# Patient Record
Sex: FEMALE | Race: OTHER | Employment: FULL TIME | URBAN - METROPOLITAN AREA
[De-identification: names, ages, dates, MRNs, and addresses within clinical notes are randomized per-mention and may not be internally consistent; named-entity substitution may affect disease eponyms.]

---

## 2018-04-17 ENCOUNTER — NURSE TRIAGE (OUTPATIENT)
Dept: OTHER | Age: 43
End: 2018-04-17

## 2018-04-17 NOTE — TELEPHONE ENCOUNTER
Action Requested: Summary for Provider     []  Critical Lab, Recommendations Needed  [] Need Additional Advice  []   FYI    []   Need Orders  [] Need Medications Sent to Pharmacy  []  Other     SUMMARY:   Only wants to see Dr. Naty Kimbrough.    Please advise when

## 2018-04-18 NOTE — TELEPHONE ENCOUNTER
Received call from patient who reports the pain in the abdomen and both breasts is still present. Patient reports she does not have any new symptom. Patient reports she was prescribed Minocycline 100mg BID by the dermatologist for Rosacea.  Patient reports

## 2018-04-20 ENCOUNTER — OFFICE VISIT (OUTPATIENT)
Dept: FAMILY MEDICINE CLINIC | Facility: CLINIC | Age: 43
End: 2018-04-20

## 2018-04-20 ENCOUNTER — APPOINTMENT (OUTPATIENT)
Dept: LAB | Age: 43
End: 2018-04-20
Attending: FAMILY MEDICINE
Payer: COMMERCIAL

## 2018-04-20 VITALS — HEIGHT: 58.5 IN | BODY MASS INDEX: 28.81 KG/M2 | WEIGHT: 141 LBS

## 2018-04-20 DIAGNOSIS — R10.13 EPIGASTRIC PAIN: ICD-10-CM

## 2018-04-20 DIAGNOSIS — Z00.00 PHYSICAL EXAM: Primary | ICD-10-CM

## 2018-04-20 PROCEDURE — 99212 OFFICE O/P EST SF 10 MIN: CPT | Performed by: FAMILY MEDICINE

## 2018-04-20 PROCEDURE — 99213 OFFICE O/P EST LOW 20 MIN: CPT | Performed by: FAMILY MEDICINE

## 2018-04-20 RX ORDER — PANTOPRAZOLE SODIUM 40 MG/1
40 TABLET, DELAYED RELEASE ORAL
Qty: 30 TABLET | Refills: 0 | Status: SHIPPED | OUTPATIENT
Start: 2018-04-20 | End: 2018-06-09

## 2018-04-20 RX ORDER — MINOCYCLINE HYDROCHLORIDE 100 MG/1
CAPSULE ORAL
Refills: 2 | COMMUNITY
Start: 2018-04-05 | End: 2018-05-08 | Stop reason: ALTCHOICE

## 2018-04-20 NOTE — TELEPHONE ENCOUNTER
Pt states she feels the same, no changes in symptoms, would like to schedule appt, appt scheduled today. Above information obtained with [employer] assistance, agent # [id number].

## 2018-04-21 ENCOUNTER — LAB ENCOUNTER (OUTPATIENT)
Dept: LAB | Age: 43
End: 2018-04-21
Attending: FAMILY MEDICINE
Payer: COMMERCIAL

## 2018-04-21 DIAGNOSIS — Z00.00 PHYSICAL EXAM: ICD-10-CM

## 2018-04-21 PROCEDURE — 80050 GENERAL HEALTH PANEL: CPT

## 2018-04-21 PROCEDURE — 80053 COMPREHEN METABOLIC PANEL: CPT

## 2018-04-21 PROCEDURE — 84443 ASSAY THYROID STIM HORMONE: CPT

## 2018-04-21 PROCEDURE — 36415 COLL VENOUS BLD VENIPUNCTURE: CPT

## 2018-04-21 PROCEDURE — 80061 LIPID PANEL: CPT

## 2018-04-21 PROCEDURE — 87338 HPYLORI STOOL AG IA: CPT

## 2018-04-25 ENCOUNTER — TELEPHONE (OUTPATIENT)
Dept: FAMILY MEDICINE CLINIC | Facility: CLINIC | Age: 43
End: 2018-04-25

## 2018-04-25 RX ORDER — LANSOPRAZOLE, AMOXICILLIN, CLARITHROMYCIN 30-500-500
1 KIT ORAL 2 TIMES DAILY
Qty: 1 EACH | Refills: 0 | Status: SHIPPED | OUTPATIENT
Start: 2018-04-25 | End: 2018-07-31

## 2018-04-25 NOTE — TELEPHONE ENCOUNTER
Pt contacted (Name and  verified) and informed of result message below. Pt declined  and verbalizes understanding. 2 week f/o OV appt made for 5/10/18.     Pt instructed to avoid spicy, greasy foods, carbonated beverages and monitor ca

## 2018-04-27 ENCOUNTER — OFFICE VISIT (OUTPATIENT)
Dept: FAMILY MEDICINE CLINIC | Facility: CLINIC | Age: 43
End: 2018-04-27

## 2018-04-27 VITALS
HEIGHT: 58.25 IN | WEIGHT: 141 LBS | HEART RATE: 74 BPM | TEMPERATURE: 99 F | DIASTOLIC BLOOD PRESSURE: 72 MMHG | SYSTOLIC BLOOD PRESSURE: 114 MMHG | BODY MASS INDEX: 29.2 KG/M2

## 2018-04-27 DIAGNOSIS — R10.9 ABDOMINAL PAIN, UNSPECIFIED ABDOMINAL LOCATION: Primary | ICD-10-CM

## 2018-04-27 DIAGNOSIS — Z01.419 WOMEN'S ANNUAL ROUTINE GYNECOLOGICAL EXAMINATION: ICD-10-CM

## 2018-04-27 PROCEDURE — 99396 PREV VISIT EST AGE 40-64: CPT | Performed by: FAMILY MEDICINE

## 2018-04-27 PROCEDURE — 99212 OFFICE O/P EST SF 10 MIN: CPT | Performed by: FAMILY MEDICINE

## 2018-04-27 PROCEDURE — 99213 OFFICE O/P EST LOW 20 MIN: CPT | Performed by: FAMILY MEDICINE

## 2018-04-27 RX ORDER — HYOSCYAMINE SULFATE EXTENDED-RELEASE 0.38 MG/1
0.38 TABLET ORAL EVERY 12 HOURS PRN
Qty: 50 TABLET | Refills: 0 | Status: SHIPPED | OUTPATIENT
Start: 2018-04-27 | End: 2018-07-31

## 2018-04-29 NOTE — PROGRESS NOTES
HPI:    Patient ID: Carlito Garcia is a 38year old female. Has some abdominal pain in epigastrric area especially and on the upper left side. States that there is more stress lately. Some nauusea no vomiting. No diarrhea.  No constipation        Review of tenderness              ASSESSMENT/PLAN:   Physical exam  (primary encounter diagnosis)  Epigastric pain  Appears as gastritis. Start protonix.  Await other results  f/u next week    Orders Placed This Encounter      Comp Metabolic Panel (14) [E]      Lipid

## 2018-04-29 NOTE — PROGRESS NOTES
HPI:    Patient ID: Constantine Camacho is a 38year old female. abd pain continues. protonix did not help but may be little. Started prevpack. Pain comes and goes lasts for fe minutes, most located on the left side.          Review of Systems   Constitutiona normal. No respiratory distress. She has no wheezes. She exhibits no tenderness. Abdominal: Soft. Bowel sounds are normal. She exhibits no distension and no mass. There is no tenderness. There is no rebound and no guarding.  Hernia confirmed negative in t (PMF=[id number])       [id number]

## 2018-05-02 ENCOUNTER — HOSPITAL ENCOUNTER (OUTPATIENT)
Dept: CT IMAGING | Age: 43
Discharge: HOME | End: 2018-05-02
Attending: FAMILY MEDICINE
Payer: COMMERCIAL

## 2018-05-02 DIAGNOSIS — R10.9 ABDOMINAL PAIN, UNSPECIFIED ABDOMINAL LOCATION: ICD-10-CM

## 2018-05-02 PROCEDURE — 74177 CT ABD & PELVIS W/CONTRAST: CPT | Performed by: FAMILY MEDICINE

## 2018-05-04 ENCOUNTER — HOSPITAL ENCOUNTER (OUTPATIENT)
Dept: MAMMOGRAPHY | Age: 43
Discharge: HOME | End: 2018-05-04
Attending: FAMILY MEDICINE
Payer: COMMERCIAL

## 2018-05-04 DIAGNOSIS — Z00.00 PHYSICAL EXAM: ICD-10-CM

## 2018-05-04 PROCEDURE — 77067 SCR MAMMO BI INCL CAD: CPT | Performed by: FAMILY MEDICINE

## 2018-05-08 ENCOUNTER — OFFICE VISIT (OUTPATIENT)
Dept: FAMILY MEDICINE CLINIC | Facility: CLINIC | Age: 43
End: 2018-05-08

## 2018-05-08 VITALS
DIASTOLIC BLOOD PRESSURE: 74 MMHG | BODY MASS INDEX: 29 KG/M2 | SYSTOLIC BLOOD PRESSURE: 128 MMHG | TEMPERATURE: 99 F | HEART RATE: 76 BPM | WEIGHT: 141 LBS

## 2018-05-08 DIAGNOSIS — R93.5 ABNORMAL ABDOMINAL CT SCAN: Primary | ICD-10-CM

## 2018-05-08 PROCEDURE — 99212 OFFICE O/P EST SF 10 MIN: CPT | Performed by: FAMILY MEDICINE

## 2018-05-08 PROCEDURE — 99214 OFFICE O/P EST MOD 30 MIN: CPT | Performed by: FAMILY MEDICINE

## 2018-05-08 RX ORDER — MEDROXYPROGESTERONE ACETATE 10 MG/1
10 TABLET ORAL DAILY
Qty: 10 TABLET | Refills: 0 | Status: SHIPPED | OUTPATIENT
Start: 2018-05-08 | End: 2018-07-31

## 2018-05-08 RX ORDER — FLUCONAZOLE 150 MG/1
150 TABLET ORAL ONCE
Qty: 1 TABLET | Refills: 0 | Status: SHIPPED | OUTPATIENT
Start: 2018-05-08 | End: 2018-05-08

## 2018-05-08 NOTE — PROGRESS NOTES
HPI:    Patient ID: Bessy Deng is a 38year old female. Ct scan showing esophagitis like changes. Doing better with abdominal pain. Still some on the keft side   Ct scan sowing excessive constipation. Also endometrial stripe thickened.    Periods i She exhibits no tenderness. Abdominal: Soft. Bowel sounds are normal. She exhibits no distension. There is no tenderness. There is no rebound. Mild left sided tenderness   Skin: Skin is dry.               ASSESSMENT/PLAN:   Abnormal abdominal ct scan  (

## 2018-05-16 ENCOUNTER — TELEPHONE (OUTPATIENT)
Dept: OTHER | Age: 43
End: 2018-05-16

## 2018-05-16 NOTE — TELEPHONE ENCOUNTER
Dr BOWMAN=please see message below and advise.     CSS soft transferred patient, with  Jackie Morrison #[id number], states that she is taking the new medication PROVERA, today is the 6th day now but still with no menstrual period, per patient Dr Farheen Buckley

## 2018-05-17 NOTE — TELEPHONE ENCOUNTER
Pt contacted and informed of results as stated below by Dr. Rizwana Shah. Pt verbalized understanding/compliance.

## 2018-05-17 NOTE — TELEPHONE ENCOUNTER
She will have period when she stops provera not while she is on provera.  All other recommendations stay the same as I told her at the visit

## 2018-05-18 ENCOUNTER — TELEPHONE (OUTPATIENT)
Dept: GASTROENTEROLOGY | Facility: CLINIC | Age: 43
End: 2018-05-18

## 2018-05-18 ENCOUNTER — OFFICE VISIT (OUTPATIENT)
Dept: GASTROENTEROLOGY | Facility: CLINIC | Age: 43
End: 2018-05-18

## 2018-05-18 VITALS
BODY MASS INDEX: 29.45 KG/M2 | DIASTOLIC BLOOD PRESSURE: 73 MMHG | SYSTOLIC BLOOD PRESSURE: 115 MMHG | HEART RATE: 87 BPM | WEIGHT: 150 LBS | HEIGHT: 60 IN

## 2018-05-18 DIAGNOSIS — R93.5 ABNORMAL CT OF THE ABDOMEN: ICD-10-CM

## 2018-05-18 DIAGNOSIS — K59.00 CONSTIPATION, UNSPECIFIED CONSTIPATION TYPE: ICD-10-CM

## 2018-05-18 DIAGNOSIS — A04.8 HELICOBACTER PYLORI (H. PYLORI) INFECTION: Primary | ICD-10-CM

## 2018-05-18 PROCEDURE — 99243 OFF/OP CNSLTJ NEW/EST LOW 30: CPT | Performed by: INTERNAL MEDICINE

## 2018-05-18 PROCEDURE — 99212 OFFICE O/P EST SF 10 MIN: CPT | Performed by: INTERNAL MEDICINE

## 2018-05-18 RX ORDER — FLUCONAZOLE 150 MG/1
150 TABLET ORAL ONCE
COMMUNITY
End: 2018-07-31

## 2018-05-18 NOTE — TELEPHONE ENCOUNTER
Scheduled for:  EGD 12666  Provider Name: Dr Alberta Chance  Date:  Wed 5/23/18  Location:  [facility]  Sedation:  MAC  Time:  8:15 am  Prep: Nothing after midnight the day before procedure and NPO 3 hrs prior procedure  Meds/Allergies Reconciled?:  NKDA  Diagnosis with cod

## 2018-05-18 NOTE — PATIENT INSTRUCTIONS
1. Start miralax (generic)    Miralax  Start taking miralax (or generic equivalent) once a day, which you can buy over the counter. To take this, mix a 1/2 capful (17 g) of the powder in with any liquid.   If there is no improvement, you can increase to a

## 2018-05-18 NOTE — H&P
[facility], [facility] - Gastroenterology                                                                                                  Clinic History and Physical 0.375 MG Oral Tablet 12 Hr Take 1 tablet (0.375 mg total) by mouth every 12 (twelve) hours as needed for Cramping.  Disp: 50 tablet Rfl: 0   triamcinolone acetonide 0.1 % External Cream APPLY TO AFFECTED AREA TWICE A DAY Disp:  Rfl: 2   Pantoprazole Sodium GERD symptoms. I also asked her to hold off on levbid further due to the constipation potential side effect. Other possibilities include peptic ulcer disease or musculoskeletal pain given the location, though this seems less likely given the description.

## 2018-05-22 ENCOUNTER — TELEPHONE (OUTPATIENT)
Dept: GASTROENTEROLOGY | Facility: CLINIC | Age: 43
End: 2018-05-22

## 2018-05-22 NOTE — TELEPHONE ENCOUNTER
Recvd message from [facility] pt needs [place] thru [employer]. Spoke to Karen Murillo [phone] gave information clinicals were needed. Spoke to [address] [place] [address] [phone] x [phone] gave clinicals EGD authorized for 5/23/18 good for 6 months Auth /Ref # [id number]. [facility] informed.

## 2018-05-23 ENCOUNTER — LAB REQUISITION (OUTPATIENT)
Dept: LAB | Facility: HOSPITAL | Age: 43
End: 2018-05-23
Payer: COMMERCIAL

## 2018-05-23 DIAGNOSIS — Z01.89 ENCOUNTER FOR OTHER SPECIFIED SPECIAL EXAMINATIONS: ICD-10-CM

## 2018-05-23 PROCEDURE — 88312 SPECIAL STAINS GROUP 1: CPT | Performed by: INTERNAL MEDICINE

## 2018-05-23 PROCEDURE — 88342 IMHCHEM/IMCYTCHM 1ST ANTB: CPT | Performed by: INTERNAL MEDICINE

## 2018-05-23 PROCEDURE — 88305 TISSUE EXAM BY PATHOLOGIST: CPT | Performed by: INTERNAL MEDICINE

## 2018-05-24 ENCOUNTER — TELEPHONE (OUTPATIENT)
Dept: GASTROENTEROLOGY | Facility: CLINIC | Age: 43
End: 2018-05-24

## 2018-05-24 DIAGNOSIS — B96.81 HELICOBACTER POSITIVE GASTRITIS: Primary | ICD-10-CM

## 2018-05-24 DIAGNOSIS — K29.70 HELICOBACTER POSITIVE GASTRITIS: Primary | ICD-10-CM

## 2018-05-24 NOTE — TELEPHONE ENCOUNTER
Pt was contacted (Belarusian) and reviewed Dr. Erica Xavier message below and  discussed the following w/ patient regarding h.  Pylori treatment:    I reviewed that h.pylori has unknown etiology, possibly contaminated water, and may cause symptoms in some patients and n

## 2018-05-24 NOTE — TELEPHONE ENCOUNTER
GI Staff:    Please call the patient and let her know the biopsies from her stomach from her upper endoscopy yesterday were unremarkable.     Because of the stool tests she had not too long ago showing h.pylori however, she should start the therapy that she

## 2018-06-09 DIAGNOSIS — Z00.00 PHYSICAL EXAM: ICD-10-CM

## 2018-06-10 RX ORDER — PANTOPRAZOLE SODIUM 40 MG/1
40 TABLET, DELAYED RELEASE ORAL
Qty: 30 TABLET | Refills: 0 | Status: SHIPPED | OUTPATIENT
Start: 2018-06-10 | End: 2020-11-16 | Stop reason: ALTCHOICE

## 2018-06-10 NOTE — TELEPHONE ENCOUNTER
Refill Protocol Appointment Criteria  · Appointment scheduled in the past 12 months or in the next 3 months  Recent Outpatient Visits            3 weeks ago Helicobacter pylori (H. pylori) infection    [address] [facility], [address] [place] [address], [facility],

## 2018-07-09 ENCOUNTER — HOSPITAL ENCOUNTER (OUTPATIENT)
Dept: ULTRASOUND IMAGING | Age: 43
Discharge: HOME | End: 2018-07-09
Attending: FAMILY MEDICINE
Payer: COMMERCIAL

## 2018-07-09 DIAGNOSIS — R93.5 ABNORMAL ABDOMINAL CT SCAN: ICD-10-CM

## 2018-07-09 PROCEDURE — 76856 US EXAM PELVIC COMPLETE: CPT | Performed by: FAMILY MEDICINE

## 2018-07-09 PROCEDURE — 76830 TRANSVAGINAL US NON-OB: CPT | Performed by: FAMILY MEDICINE

## 2018-07-16 ENCOUNTER — TELEPHONE (OUTPATIENT)
Dept: GASTROENTEROLOGY | Facility: CLINIC | Age: 43
End: 2018-07-16

## 2018-07-16 NOTE — TELEPHONE ENCOUNTER
Attempted to reach pt, no answer and no VM is set up. Copy of order w/ instructions and lab hrs/locations mailed to pt home address today.

## 2018-07-16 NOTE — TELEPHONE ENCOUNTER
----- Message from Marilou Manley RN sent at 2018  5:15 PM CDT -----  Regardin-10 wk h .pylori recall  Recall entered for h.pylori stool recheck on 2018 per

## 2018-07-19 ENCOUNTER — APPOINTMENT (OUTPATIENT)
Dept: LAB | Facility: HOSPITAL | Age: 43
End: 2018-07-19
Attending: INTERNAL MEDICINE
Payer: COMMERCIAL

## 2018-07-19 DIAGNOSIS — B96.81 HELICOBACTER POSITIVE GASTRITIS: ICD-10-CM

## 2018-07-19 DIAGNOSIS — K29.70 HELICOBACTER POSITIVE GASTRITIS: ICD-10-CM

## 2018-07-19 PROCEDURE — 87338 HPYLORI STOOL AG IA: CPT

## 2018-07-19 NOTE — TELEPHONE ENCOUNTER
Pt contacted and reviewed that she is due for h.pylori recheck at this time.  She verbalized understanding and will complete at the [facility] location today as she would like to see if she can receive the results during her OV tomorrow (7/20/18) with

## 2018-07-20 ENCOUNTER — OFFICE VISIT (OUTPATIENT)
Dept: GASTROENTEROLOGY | Facility: CLINIC | Age: 43
End: 2018-07-20
Payer: COMMERCIAL

## 2018-07-20 VITALS
DIASTOLIC BLOOD PRESSURE: 66 MMHG | HEART RATE: 72 BPM | BODY MASS INDEX: 28.66 KG/M2 | WEIGHT: 146 LBS | SYSTOLIC BLOOD PRESSURE: 112 MMHG | HEIGHT: 60 IN

## 2018-07-20 DIAGNOSIS — K29.70 HELICOBACTER POSITIVE GASTRITIS: ICD-10-CM

## 2018-07-20 DIAGNOSIS — K59.00 CONSTIPATION, UNSPECIFIED CONSTIPATION TYPE: ICD-10-CM

## 2018-07-20 DIAGNOSIS — R10.9 ABDOMINAL PAIN, UNSPECIFIED ABDOMINAL LOCATION: Primary | ICD-10-CM

## 2018-07-20 DIAGNOSIS — B96.81 HELICOBACTER POSITIVE GASTRITIS: ICD-10-CM

## 2018-07-20 PROCEDURE — 99213 OFFICE O/P EST LOW 20 MIN: CPT | Performed by: INTERNAL MEDICINE

## 2018-07-20 PROCEDURE — 99212 OFFICE O/P EST SF 10 MIN: CPT | Performed by: INTERNAL MEDICINE

## 2018-07-20 RX ORDER — MINOCYCLINE HYDROCHLORIDE 100 MG/1
100 CAPSULE ORAL 2 TIMES DAILY
COMMUNITY
Start: 2018-07-03 | End: 2020-11-16 | Stop reason: ALTCHOICE

## 2018-07-20 NOTE — PATIENT INSTRUCTIONS
1. I will let you know about the results of the stool test for h.pylori    2. Continue your current diet    3. Ok to take miralax as needed for constipation    4. Call me if you have any questions or concerns.

## 2018-07-20 NOTE — PROGRESS NOTES
[address] [facility] - Gastroenterology                                                                                                  Clinic Progress Note    Patient pr (PROVERA) 10 MG Oral Tab Take 1 tablet (10 mg total) by mouth daily. Disp: 10 tablet Rfl: 0   Hyoscyamine Sulfate ER (LEVBID) 0.375 MG Oral Tablet 12 Hr Take 1 tablet (0.375 mg total) by mouth every 12 (twelve) hours as needed for Cramping.  Disp: 50 tablet Referrals:  None       Jarad Flores MD  [facility], [facility] - Gastroenterology  7/20/2018  11:21 AM

## 2018-07-22 LAB — HELICOBACTER PYLORI AG, FECAL: NEGATIVE

## 2018-07-23 ENCOUNTER — TELEPHONE (OUTPATIENT)
Dept: GASTROENTEROLOGY | Facility: CLINIC | Age: 43
End: 2018-07-23

## 2018-07-23 ENCOUNTER — TELEPHONE (OUTPATIENT)
Dept: OTHER | Age: 43
End: 2018-07-23

## 2018-07-23 NOTE — TELEPHONE ENCOUNTER
Spoke with patient (identified name and ), results reviewed and agrees with plan. Letter never received in mail.   Requesting it to be sent again,  Letter mailed    Notes recorded by Gaviota Rizo CMA on 2018 at 9:53 AM CDT  Letter mailed  -----

## 2018-07-23 NOTE — TELEPHONE ENCOUNTER
GI Staff:    Please let the patient know her h.pylori stool test shows it is gone and successfully treated.     Thanks    Glo Rush MD  [facility], [facility] - Gastroenterology  7/23/2018  8:18 AM

## 2018-07-26 NOTE — TELEPHONE ENCOUNTER
3rd attempt to reach pt, no answer and no VM is set up. Normal results letter generated and mailed to pt home address with results.

## 2018-07-31 ENCOUNTER — OFFICE VISIT (OUTPATIENT)
Dept: OBGYN CLINIC | Facility: CLINIC | Age: 43
End: 2018-07-31
Payer: COMMERCIAL

## 2018-07-31 ENCOUNTER — APPOINTMENT (OUTPATIENT)
Dept: LAB | Facility: HOSPITAL | Age: 43
End: 2018-07-31
Attending: OBSTETRICS & GYNECOLOGY
Payer: COMMERCIAL

## 2018-07-31 VITALS
BODY MASS INDEX: 28 KG/M2 | DIASTOLIC BLOOD PRESSURE: 79 MMHG | HEART RATE: 74 BPM | SYSTOLIC BLOOD PRESSURE: 132 MMHG | WEIGHT: 143.81 LBS

## 2018-07-31 DIAGNOSIS — N92.6 IRREGULAR MENSES: Primary | ICD-10-CM

## 2018-07-31 DIAGNOSIS — N92.6 IRREGULAR MENSES: ICD-10-CM

## 2018-07-31 LAB
ERYTHROCYTE [DISTWIDTH] IN BLOOD BY AUTOMATED COUNT: 13.4 % (ref 11–15)
FSH SERPL-ACNC: 2.9 MIU/ML
HCT VFR BLD AUTO: 39.4 % (ref 35–48)
HGB BLD-MCNC: 13.3 G/DL (ref 12–16)
MCH RBC QN AUTO: 30.1 PG (ref 27–32)
MCHC RBC AUTO-ENTMCNC: 33.7 G/DL (ref 32–37)
MCV RBC AUTO: 89.1 FL (ref 80–100)
PLATELET # BLD AUTO: 266 K/UL (ref 140–400)
PMV BLD AUTO: 7.6 FL (ref 7.4–10.3)
PROGEST SERPL-MCNC: 0.6 NG/ML
RBC # BLD AUTO: 4.42 M/UL (ref 3.7–5.4)
TSH SERPL-ACNC: 1.01 UIU/ML (ref 0.45–5.33)
WBC # BLD AUTO: 4.2 K/UL (ref 4–11)

## 2018-07-31 PROCEDURE — 85027 COMPLETE CBC AUTOMATED: CPT

## 2018-07-31 PROCEDURE — 84144 ASSAY OF PROGESTERONE: CPT

## 2018-07-31 PROCEDURE — 84443 ASSAY THYROID STIM HORMONE: CPT

## 2018-07-31 PROCEDURE — 36415 COLL VENOUS BLD VENIPUNCTURE: CPT

## 2018-07-31 PROCEDURE — 99202 OFFICE O/P NEW SF 15 MIN: CPT | Performed by: OBSTETRICS & GYNECOLOGY

## 2018-07-31 PROCEDURE — 83001 ASSAY OF GONADOTROPIN (FSH): CPT

## 2018-08-01 PROBLEM — N92.6 IRREGULAR MENSES: Status: ACTIVE | Noted: 2018-08-01

## 2018-08-01 NOTE — PROGRESS NOTES
HPI:    Patient ID: Mehnaz Still is a 38year old female. HPI  New patient  GYN problem visit  Complains of abnormal menstrual periods for the past 4 months which occur every 15 days and are heavier than usual flow in the last 4-5 days each.   Also compl lesions. Vagina- normal, no lesions or discharge. Moist and well supported. Bladder-  nontender. No masses. Normal support. No evidence of cystocele,  abnormal bladder neck mobility or evident urinary incontinence.   Cervix- smooth, normal epithelium

## 2018-08-09 ENCOUNTER — OFFICE VISIT (OUTPATIENT)
Dept: OBGYN CLINIC | Facility: CLINIC | Age: 43
End: 2018-08-09
Payer: COMMERCIAL

## 2018-08-09 VITALS — DIASTOLIC BLOOD PRESSURE: 78 MMHG | WEIGHT: 144.38 LBS | BODY MASS INDEX: 28 KG/M2 | SYSTOLIC BLOOD PRESSURE: 112 MMHG

## 2018-08-09 DIAGNOSIS — N92.6 IRREGULAR MENSES: Primary | ICD-10-CM

## 2018-08-09 DIAGNOSIS — N97.0 ANOVULATORY (DYSFUNCTIONAL UTERINE) BLEEDING: ICD-10-CM

## 2018-08-09 PROCEDURE — 99213 OFFICE O/P EST LOW 20 MIN: CPT | Performed by: OBSTETRICS & GYNECOLOGY

## 2018-08-09 RX ORDER — MEDROXYPROGESTERONE ACETATE 10 MG/1
10 TABLET ORAL DAILY
Qty: 36 TABLET | Refills: 0 | Status: SHIPPED | OUTPATIENT
Start: 2018-08-09 | End: 2018-11-05

## 2018-11-06 RX ORDER — MEDROXYPROGESTERONE ACETATE 10 MG/1
10 TABLET ORAL DAILY
Qty: 36 TABLET | Refills: 0 | Status: SHIPPED | OUTPATIENT
Start: 2018-11-06 | End: 2019-11-06

## 2020-08-16 ENCOUNTER — HOSPITAL ENCOUNTER (OUTPATIENT)
Age: 45
Discharge: STILL ADMITTED OR ADMINISTRATIVE | End: 2020-08-16
Attending: EMERGENCY MEDICINE
Payer: COMMERCIAL

## 2020-08-17 ENCOUNTER — TELEPHONE (OUTPATIENT)
Dept: FAMILY MEDICINE CLINIC | Facility: CLINIC | Age: 45
End: 2020-08-17

## 2020-08-17 ENCOUNTER — TELEPHONE (OUTPATIENT)
Dept: INTERNAL MEDICINE CLINIC | Facility: HOSPITAL | Age: 45
End: 2020-08-17

## 2020-08-17 ENCOUNTER — LAB ENCOUNTER (OUTPATIENT)
Dept: LAB | Facility: HOSPITAL | Age: 45
End: 2020-08-17
Attending: PREVENTIVE MEDICINE
Payer: COMMERCIAL

## 2020-08-17 DIAGNOSIS — Z20.822 CLOSE EXPOSURE TO COVID-19 VIRUS: Primary | ICD-10-CM

## 2020-08-17 DIAGNOSIS — Z20.822 CLOSE EXPOSURE TO COVID-19 VIRUS: ICD-10-CM

## 2020-08-17 LAB — SARS-COV-2 RNA RESP QL NAA+PROBE: DETECTED

## 2020-08-17 NOTE — TELEPHONE ENCOUNTER
Action Requested: Summary for Provider     []  Critical Lab, Recommendations Needed  [] Need Additional Advice  []   FYI    []   Need Orders  [] Need Medications Sent to Pharmacy  []  Other     SUMMARY: pt going to ER for severe back pain and dizziness.   P

## 2020-08-18 ENCOUNTER — TELEPHONE (OUTPATIENT)
Dept: FAMILY MEDICINE CLINIC | Facility: CLINIC | Age: 45
End: 2020-08-18

## 2020-08-18 LAB — AMB EXT COVID-19 RESULT: DETECTED

## 2020-08-18 NOTE — TELEPHONE ENCOUNTER
Action Requested: Summary for Provider     []  Critical Lab, Recommendations Needed  [] Need Additional Advice  []   FYI    []   Need Orders  [] Need Medications Sent to Pharmacy  []  Other     SUMMARY: reports pain in her bones.   Taking tylenol 1 gram bella

## 2020-08-19 ENCOUNTER — TELEMEDICINE (OUTPATIENT)
Dept: FAMILY MEDICINE CLINIC | Facility: CLINIC | Age: 45
End: 2020-08-19
Payer: COMMERCIAL

## 2020-08-19 ENCOUNTER — PATIENT OUTREACH (OUTPATIENT)
Dept: CASE MANAGEMENT | Age: 45
End: 2020-08-19

## 2020-08-19 ENCOUNTER — APPOINTMENT (OUTPATIENT)
Dept: GENERAL RADIOLOGY | Facility: HOSPITAL | Age: 45
End: 2020-08-19
Attending: EMERGENCY MEDICINE
Payer: COMMERCIAL

## 2020-08-19 ENCOUNTER — HOSPITAL ENCOUNTER (EMERGENCY)
Facility: HOSPITAL | Age: 45
Discharge: HOME | End: 2020-08-19
Attending: EMERGENCY MEDICINE
Payer: COMMERCIAL

## 2020-08-19 VITALS
OXYGEN SATURATION: 97 % | HEART RATE: 97 BPM | RESPIRATION RATE: 22 BRPM | SYSTOLIC BLOOD PRESSURE: 131 MMHG | TEMPERATURE: 100 F | DIASTOLIC BLOOD PRESSURE: 83 MMHG

## 2020-08-19 DIAGNOSIS — U07.1 COVID-19: Primary | ICD-10-CM

## 2020-08-19 DIAGNOSIS — U07.1 COVID-19 VIRUS INFECTION: Primary | ICD-10-CM

## 2020-08-19 LAB
ANION GAP SERPL CALC-SCNC: 5 MMOL/L (ref 0–18)
B-HCG UR QL: NEGATIVE
BASOPHILS # BLD AUTO: 0.01 X10(3) UL (ref 0–0.2)
BASOPHILS NFR BLD AUTO: 0.2 %
BILIRUB UR QL: NEGATIVE
BUN BLD-MCNC: 7 MG/DL (ref 7–18)
BUN/CREAT SERPL: 10.4 (ref 10–20)
CALCIUM BLD-MCNC: 8.3 MG/DL (ref 8.5–10.1)
CHLORIDE SERPL-SCNC: 105 MMOL/L (ref 98–112)
CK SERPL-CCNC: 78 U/L (ref 26–192)
CO2 SERPL-SCNC: 26 MMOL/L (ref 21–32)
COLOR UR: YELLOW
CREAT BLD-MCNC: 0.67 MG/DL (ref 0.55–1.02)
CRP SERPL-MCNC: 4.78 MG/DL (ref ?–0.3)
D DIMER PPP FEU-MCNC: <0.27 UG/ML FEU (ref ?–0.5)
DEPRECATED HBV CORE AB SER IA-ACNC: 82.8 NG/ML (ref 12–240)
DEPRECATED RDW RBC AUTO: 41.2 FL (ref 35.1–46.3)
EOSINOPHIL # BLD AUTO: 0 X10(3) UL (ref 0–0.7)
EOSINOPHIL NFR BLD AUTO: 0 %
ERYTHROCYTE [DISTWIDTH] IN BLOOD BY AUTOMATED COUNT: 12.8 % (ref 11–15)
GLUCOSE BLD-MCNC: 94 MG/DL (ref 70–99)
GLUCOSE UR-MCNC: NEGATIVE MG/DL
HCT VFR BLD AUTO: 44.4 % (ref 35–48)
HGB BLD-MCNC: 15 G/DL (ref 12–16)
HGB UR QL STRIP.AUTO: NEGATIVE
IMM GRANULOCYTES # BLD AUTO: 0.01 X10(3) UL (ref 0–1)
IMM GRANULOCYTES NFR BLD: 0.2 %
KETONES UR-MCNC: NEGATIVE MG/DL
LDH SERPL L TO P-CCNC: 198 U/L
LYMPHOCYTES # BLD AUTO: 1.07 X10(3) UL (ref 1–4)
LYMPHOCYTES NFR BLD AUTO: 24.7 %
MCH RBC QN AUTO: 29.7 PG (ref 26–34)
MCHC RBC AUTO-ENTMCNC: 33.8 G/DL (ref 31–37)
MCV RBC AUTO: 87.9 FL (ref 80–100)
MONOCYTES # BLD AUTO: 0.45 X10(3) UL (ref 0.1–1)
MONOCYTES NFR BLD AUTO: 10.4 %
NEUTROPHILS # BLD AUTO: 2.79 X10 (3) UL (ref 1.5–7.7)
NEUTROPHILS # BLD AUTO: 2.79 X10(3) UL (ref 1.5–7.7)
NEUTROPHILS NFR BLD AUTO: 64.5 %
NITRITE UR QL STRIP.AUTO: NEGATIVE
NT-PROBNP SERPL-MCNC: 17 PG/ML (ref ?–125)
OSMOLALITY SERPL CALC.SUM OF ELEC: 280 MOSM/KG (ref 275–295)
PH UR: 7 [PH] (ref 5–8)
PLATELET # BLD AUTO: 166 10(3)UL (ref 150–450)
POTASSIUM SERPL-SCNC: 3.6 MMOL/L (ref 3.5–5.1)
PROCALCITONIN SERPL-MCNC: 0.03 NG/ML (ref ?–0.16)
PROT UR-MCNC: NEGATIVE MG/DL
RBC # BLD AUTO: 5.05 X10(6)UL (ref 3.8–5.3)
RBC #/AREA URNS AUTO: 2 /HPF
SODIUM SERPL-SCNC: 136 MMOL/L (ref 136–145)
SP GR UR STRIP: 1.01 (ref 1–1.03)
TROPONIN I SERPL-MCNC: <0.045 NG/ML (ref ?–0.04)
UROBILINOGEN UR STRIP-ACNC: <2
WBC # BLD AUTO: 4.3 X10(3) UL (ref 4–11)
WBC #/AREA URNS AUTO: 3 /HPF

## 2020-08-19 PROCEDURE — 96375 TX/PRO/DX INJ NEW DRUG ADDON: CPT

## 2020-08-19 PROCEDURE — 99284 EMERGENCY DEPT VISIT MOD MDM: CPT

## 2020-08-19 PROCEDURE — 96374 THER/PROPH/DIAG INJ IV PUSH: CPT

## 2020-08-19 PROCEDURE — 84484 ASSAY OF TROPONIN QUANT: CPT | Performed by: EMERGENCY MEDICINE

## 2020-08-19 PROCEDURE — 81025 URINE PREGNANCY TEST: CPT

## 2020-08-19 PROCEDURE — 84145 PROCALCITONIN (PCT): CPT | Performed by: EMERGENCY MEDICINE

## 2020-08-19 PROCEDURE — 86140 C-REACTIVE PROTEIN: CPT | Performed by: EMERGENCY MEDICINE

## 2020-08-19 PROCEDURE — 81001 URINALYSIS AUTO W/SCOPE: CPT | Performed by: EMERGENCY MEDICINE

## 2020-08-19 PROCEDURE — 85025 COMPLETE CBC W/AUTO DIFF WBC: CPT | Performed by: EMERGENCY MEDICINE

## 2020-08-19 PROCEDURE — 99453 REM MNTR PHYSIOL PARAM SETUP: CPT

## 2020-08-19 PROCEDURE — 82728 ASSAY OF FERRITIN: CPT | Performed by: EMERGENCY MEDICINE

## 2020-08-19 PROCEDURE — 83880 ASSAY OF NATRIURETIC PEPTIDE: CPT | Performed by: EMERGENCY MEDICINE

## 2020-08-19 PROCEDURE — 80048 BASIC METABOLIC PNL TOTAL CA: CPT | Performed by: EMERGENCY MEDICINE

## 2020-08-19 PROCEDURE — 85379 FIBRIN DEGRADATION QUANT: CPT | Performed by: EMERGENCY MEDICINE

## 2020-08-19 PROCEDURE — 82550 ASSAY OF CK (CPK): CPT | Performed by: EMERGENCY MEDICINE

## 2020-08-19 PROCEDURE — 99213 OFFICE O/P EST LOW 20 MIN: CPT | Performed by: FAMILY MEDICINE

## 2020-08-19 PROCEDURE — 71045 X-RAY EXAM CHEST 1 VIEW: CPT | Performed by: EMERGENCY MEDICINE

## 2020-08-19 PROCEDURE — 83615 LACTATE (LD) (LDH) ENZYME: CPT | Performed by: EMERGENCY MEDICINE

## 2020-08-19 RX ORDER — MELATONIN
1 3 TIMES DAILY
Qty: 60 TABLET | Refills: 0 | Status: SHIPPED | OUTPATIENT
Start: 2020-08-19 | End: 2020-09-18

## 2020-08-19 RX ORDER — TRAMADOL HYDROCHLORIDE 50 MG/1
50 TABLET ORAL EVERY 6 HOURS PRN
Qty: 30 TABLET | Refills: 0 | Status: SHIPPED | OUTPATIENT
Start: 2020-08-19 | End: 2020-11-16 | Stop reason: ALTCHOICE

## 2020-08-19 RX ORDER — LORAZEPAM 2 MG/ML
0.5 INJECTION INTRAMUSCULAR ONCE
Status: COMPLETED | OUTPATIENT
Start: 2020-08-19 | End: 2020-08-19

## 2020-08-19 RX ORDER — KETOROLAC TROMETHAMINE 30 MG/ML
30 INJECTION, SOLUTION INTRAMUSCULAR; INTRAVENOUS ONCE
Status: COMPLETED | OUTPATIENT
Start: 2020-08-19 | End: 2020-08-19

## 2020-08-19 NOTE — PROGRESS NOTES
NCM attempted to contact patient for day 1 home monitoring. Line kept ringing so unable to leave message. NCM will try again later.

## 2020-08-19 NOTE — ED INITIAL ASSESSMENT (HPI)
Pt reports she is having a panic attack that began yesterday. Pt is hyperventilating in triage and c/o saima hand tingling. Pt now states her  just prescribed tramadol for pain in her \"bones\" because pt tested positive for covid yesterday.  Pt took her

## 2020-08-19 NOTE — PROGRESS NOTES
HPI:    Patient ID: Ronal Avitia is a 53year old female.     Telehealth outside of [facility] [address] Verbal Consent   I conducted a telehealth visit with Ronal Avitia today, 08/19/20, which was completed using two-way, real-time interactive audio and vid apnea, cough, chest tightness and shortness of breath. Cardiovascular: Negative. Negative for chest pain, palpitations and leg swelling. Gastrointestinal: Negative. Negative for abdominal pain. Skin: Negative.                 Current Outpatient Med

## 2020-08-20 ENCOUNTER — PATIENT OUTREACH (OUTPATIENT)
Dept: CASE MANAGEMENT | Age: 45
End: 2020-08-20

## 2020-08-20 NOTE — ED PROVIDER NOTES
Patient Seen in: [facility] AND [facility] Emergency Department      History   Patient presents with:   Anxiety/Panic attack  Viral Syndrome    Stated Complaint: anxiety    HPI    Patient presents the emergency department complaining of body aches, headache and c Abdominal:      General: There is no distension. Palpations: Abdomen is soft. Tenderness: There is no tenderness. Musculoskeletal: Normal range of motion. General: No tenderness. Skin:     General: Skin is warm and dry.       Finding findings: Xr Chest Ap Portable  (cpt=71045)    Result Date: 8/19/2020  CONCLUSION:  No acute cardiopulmonary process.     Dictated by (CST): Casey Xiong MD on 8/19/2020 at 6:28 PM     Finalized by (CST): Casey Xiong MD on 8/19/2020 at 6:29 PM

## 2020-08-20 NOTE — PROGRESS NOTES
NCM attempted to contact patient for day 1 home monitoring. Line kept ringing with no option to leave voicemail. NCM will try again tomorrow.

## 2020-08-21 ENCOUNTER — APPOINTMENT (OUTPATIENT)
Dept: GENERAL RADIOLOGY | Facility: HOSPITAL | Age: 45
End: 2020-08-21
Attending: EMERGENCY MEDICINE
Payer: COMMERCIAL

## 2020-08-21 ENCOUNTER — HOSPITAL ENCOUNTER (EMERGENCY)
Facility: HOSPITAL | Age: 45
Discharge: HOME | End: 2020-08-21
Attending: EMERGENCY MEDICINE
Payer: COMMERCIAL

## 2020-08-21 ENCOUNTER — PATIENT OUTREACH (OUTPATIENT)
Dept: CASE MANAGEMENT | Age: 45
End: 2020-08-21

## 2020-08-21 ENCOUNTER — TELEPHONE (OUTPATIENT)
Dept: FAMILY MEDICINE CLINIC | Facility: CLINIC | Age: 45
End: 2020-08-21

## 2020-08-21 VITALS
TEMPERATURE: 99 F | WEIGHT: 150 LBS | DIASTOLIC BLOOD PRESSURE: 81 MMHG | BODY MASS INDEX: 25.61 KG/M2 | RESPIRATION RATE: 24 BRPM | SYSTOLIC BLOOD PRESSURE: 126 MMHG | HEIGHT: 64 IN | OXYGEN SATURATION: 96 % | HEART RATE: 86 BPM

## 2020-08-21 DIAGNOSIS — F41.9 ANXIETY: ICD-10-CM

## 2020-08-21 DIAGNOSIS — U07.1 COVID-19 VIRUS INFECTION: Primary | ICD-10-CM

## 2020-08-21 LAB
ANION GAP SERPL CALC-SCNC: 7 MMOL/L (ref 0–18)
BASOPHILS # BLD AUTO: 0.01 X10(3) UL (ref 0–0.2)
BASOPHILS NFR BLD AUTO: 0.2 %
BUN BLD-MCNC: 6 MG/DL (ref 7–18)
BUN/CREAT SERPL: 11.1 (ref 10–20)
CALCIUM BLD-MCNC: 8.8 MG/DL (ref 8.5–10.1)
CHLORIDE SERPL-SCNC: 106 MMOL/L (ref 98–112)
CO2 SERPL-SCNC: 25 MMOL/L (ref 21–32)
CREAT BLD-MCNC: 0.54 MG/DL (ref 0.55–1.02)
DEPRECATED RDW RBC AUTO: 41.6 FL (ref 35.1–46.3)
EOSINOPHIL # BLD AUTO: 0 X10(3) UL (ref 0–0.7)
EOSINOPHIL NFR BLD AUTO: 0 %
ERYTHROCYTE [DISTWIDTH] IN BLOOD BY AUTOMATED COUNT: 12.9 % (ref 11–15)
GLUCOSE BLD-MCNC: 87 MG/DL (ref 70–99)
HCT VFR BLD AUTO: 39.8 % (ref 35–48)
HGB BLD-MCNC: 13.2 G/DL (ref 12–16)
IMM GRANULOCYTES # BLD AUTO: 0.01 X10(3) UL (ref 0–1)
IMM GRANULOCYTES NFR BLD: 0.2 %
LYMPHOCYTES # BLD AUTO: 1.16 X10(3) UL (ref 1–4)
LYMPHOCYTES NFR BLD AUTO: 23.8 %
MCH RBC QN AUTO: 29.2 PG (ref 26–34)
MCHC RBC AUTO-ENTMCNC: 33.2 G/DL (ref 31–37)
MCV RBC AUTO: 88.1 FL (ref 80–100)
MONOCYTES # BLD AUTO: 0.36 X10(3) UL (ref 0.1–1)
MONOCYTES NFR BLD AUTO: 7.4 %
NEUTROPHILS # BLD AUTO: 3.34 X10 (3) UL (ref 1.5–7.7)
NEUTROPHILS # BLD AUTO: 3.34 X10(3) UL (ref 1.5–7.7)
NEUTROPHILS NFR BLD AUTO: 68.4 %
OSMOLALITY SERPL CALC.SUM OF ELEC: 283 MOSM/KG (ref 275–295)
PLATELET # BLD AUTO: 178 10(3)UL (ref 150–450)
POTASSIUM SERPL-SCNC: 3.7 MMOL/L (ref 3.5–5.1)
RBC # BLD AUTO: 4.52 X10(6)UL (ref 3.8–5.3)
SODIUM SERPL-SCNC: 138 MMOL/L (ref 136–145)
TROPONIN I SERPL-MCNC: <0.045 NG/ML (ref ?–0.04)
WBC # BLD AUTO: 4.9 X10(3) UL (ref 4–11)

## 2020-08-21 PROCEDURE — 85025 COMPLETE CBC W/AUTO DIFF WBC: CPT | Performed by: EMERGENCY MEDICINE

## 2020-08-21 PROCEDURE — 71045 X-RAY EXAM CHEST 1 VIEW: CPT | Performed by: EMERGENCY MEDICINE

## 2020-08-21 PROCEDURE — 93010 ELECTROCARDIOGRAM REPORT: CPT | Performed by: EMERGENCY MEDICINE

## 2020-08-21 PROCEDURE — 93005 ELECTROCARDIOGRAM TRACING: CPT

## 2020-08-21 PROCEDURE — 99284 EMERGENCY DEPT VISIT MOD MDM: CPT

## 2020-08-21 PROCEDURE — 84484 ASSAY OF TROPONIN QUANT: CPT | Performed by: EMERGENCY MEDICINE

## 2020-08-21 PROCEDURE — 96374 THER/PROPH/DIAG INJ IV PUSH: CPT

## 2020-08-21 PROCEDURE — 80048 BASIC METABOLIC PNL TOTAL CA: CPT | Performed by: EMERGENCY MEDICINE

## 2020-08-21 RX ORDER — LORAZEPAM 1 MG/1
1 TABLET ORAL 2 TIMES DAILY PRN
Qty: 12 TABLET | Refills: 0 | Status: SHIPPED | OUTPATIENT
Start: 2020-08-21 | End: 2020-11-16 | Stop reason: ALTCHOICE

## 2020-08-21 RX ORDER — LORAZEPAM 2 MG/ML
1 INJECTION INTRAMUSCULAR ONCE
Status: COMPLETED | OUTPATIENT
Start: 2020-08-21 | End: 2020-08-21

## 2020-08-21 RX ORDER — IBUPROFEN 600 MG/1
600 TABLET ORAL ONCE
Status: COMPLETED | OUTPATIENT
Start: 2020-08-21 | End: 2020-08-21

## 2020-08-21 NOTE — ED INITIAL ASSESSMENT (HPI)
Pt tested + for COVID Monday here, came back for body aches to the left , SOB, chest pain. Pt moaning in triage. Oxygen saturation at 96 % on room air.

## 2020-08-21 NOTE — PROGRESS NOTES
NCM attempted to contact patient for day 1 home monitoring. Line kept ringing with no option to leave voicemail. Sent TE to PCP office.

## 2020-08-21 NOTE — ED NOTES
Patient medicated as noted; Ambulated to bathroom with assist of this RN and spouse. Will continue to monitor.

## 2020-08-21 NOTE — TELEPHONE ENCOUNTER
NCM attempted 3 times to reach patient but unsuccessful, line rings with no option for voicemail. Please reach out to patient to schedule virtual visit for COVID update. Thank you!

## 2020-08-21 NOTE — ED PROVIDER NOTES
Patient Seen in: [facility] Emergency Department    History   Patient presents with:  Chest Pain  Pain      HPI    The patient presents to the ED complaining of worsening symptoms due to COVID-19 infection.   Symptoms started 5 days ago and COVID t O2 Device None (Room air)       All measures to prevent infection transmission during my interaction with the patient were taken. The patient was already wearing a droplet mask on my arrival to the room.  Personal protective equipment including droplet ma Final result                                                 Please view results for these tests on the individual orders.    RAINBOW DRAW BLUE   RAINBOW DRAW LAVENDER   RAINBOW DRAW LIGHT GREEN   RAINBOW DRAW GOLD   CBC W/ DIFFER Course: Patient returns to the ED with worsened COVID-19 symptoms. Work-up several days ago without significant abnormalities or inflammatory marker elevation.   Slight worsening of chest x-ray today but vital signs remained normal, no respiratory distress

## 2020-08-25 ENCOUNTER — PATIENT OUTREACH (OUTPATIENT)
Dept: CASE MANAGEMENT | Age: 45
End: 2020-08-25

## 2020-08-25 NOTE — PROGRESS NOTES
NC has attempted to reach patient 4 times for home monitoring and line continues to ring with no option for voicemail. Called patient's spouse and LMTCB.   Please inform PCP about our attempts and our policy to contact patient 3 times before asking PCP of

## 2020-08-25 NOTE — PROGRESS NOTES
Home Monitoring Condition Update    Covid19+ test date: 8/18/20      Consent Verification:  Assessment Completed With: Patient  HIPAA Verified?   Yes    COVID-19 HOME MONITORING 8/25/2020   Temperature 94.8   Reading From Axillary   SPO2 95   Pulse 98   P they have tested positive for COVID-19.       The patient was also directed to continue to isolate away from other household members when possible and stay completely isolated from the general public 3 days after symptoms resolve or 10 days total (whichever

## 2020-08-26 ENCOUNTER — VIRTUAL PHONE E/M (OUTPATIENT)
Dept: FAMILY MEDICINE CLINIC | Facility: CLINIC | Age: 45
End: 2020-08-26
Payer: COMMERCIAL

## 2020-08-26 DIAGNOSIS — U07.1 LAB TEST POSITIVE FOR DETECTION OF COVID-19 VIRUS: Primary | ICD-10-CM

## 2020-08-26 PROCEDURE — 99213 OFFICE O/P EST LOW 20 MIN: CPT | Performed by: FAMILY MEDICINE

## 2020-08-26 NOTE — PROGRESS NOTES
HPI:    Patient ID: Oliva Hong is a 53year old female.     Telehealth outside of [facility] [address] Verbal Consent   I conducted a telehealth visit with Oliva Hong today, 09/04/20, which was completed using two-way, real-time interactive audio and vid Negative. see above Respiratory: Negative. Negative for apnea, cough, chest tightness . There is slight shortness of breath. Cardiovascular: Negative. Negative for chest pain, palpitations and leg swelling. Gastrointestinal: Negative.   Negative for

## 2020-08-27 ENCOUNTER — TELEPHONE (OUTPATIENT)
Dept: FAMILY MEDICINE CLINIC | Facility: CLINIC | Age: 45
End: 2020-08-27

## 2020-08-27 NOTE — TELEPHONE ENCOUNTER
Patient had virtual visit yesterday.      Please advise if patient is to continue home monitoring program (and indicate frequency of calls - daily, every other day, etc and when next virtual visit should be scheduled for status update) OR if patient can be

## 2020-08-27 NOTE — TELEPHONE ENCOUNTER
Noted. Pt removed from home monitoring program per PCP. Called pt to inform her to remain in self isolation until 9-1-20 per PCP. LM on spouse's number and advised her to call PCP office with further questions.

## 2020-08-31 ENCOUNTER — TELEPHONE (OUTPATIENT)
Dept: FAMILY MEDICINE CLINIC | Facility: CLINIC | Age: 45
End: 2020-08-31

## 2020-08-31 NOTE — TELEPHONE ENCOUNTER
Patient called in stating that she received a message stating that that she has to remain in self isolation until 9/1/20. Patient is requesting a letter from Dr. Isabelle Joiner approving her to return to work. She states that she feels much better. She is requesting the letter be faxed to employer. Patient will return call with fax number.

## 2020-09-03 ENCOUNTER — NURSE TRIAGE (OUTPATIENT)
Dept: FAMILY MEDICINE CLINIC | Facility: CLINIC | Age: 45
End: 2020-09-03

## 2020-09-03 NOTE — TELEPHONE ENCOUNTER
Patient requested px appt. css transferred to me because she mentioned numbness in feet. Patient states she has numbness in feet; yesterday started with daily activity. Was positive for covid 8/17/20; has been in quarantine for 14 days.    Denied symp

## 2020-09-14 ENCOUNTER — OFFICE VISIT (OUTPATIENT)
Dept: FAMILY MEDICINE CLINIC | Facility: CLINIC | Age: 45
End: 2020-09-14
Payer: COMMERCIAL

## 2020-09-14 ENCOUNTER — LAB ENCOUNTER (OUTPATIENT)
Dept: LAB | Age: 45
End: 2020-09-14
Attending: FAMILY MEDICINE
Payer: COMMERCIAL

## 2020-09-14 VITALS
DIASTOLIC BLOOD PRESSURE: 73 MMHG | TEMPERATURE: 97 F | BODY MASS INDEX: 25.61 KG/M2 | HEIGHT: 64 IN | WEIGHT: 150 LBS | OXYGEN SATURATION: 99 % | HEART RATE: 69 BPM | SYSTOLIC BLOOD PRESSURE: 109 MMHG

## 2020-09-14 DIAGNOSIS — Z00.00 WELL ADULT EXAM: ICD-10-CM

## 2020-09-14 DIAGNOSIS — R10.30 LOWER ABDOMINAL PAIN: ICD-10-CM

## 2020-09-14 DIAGNOSIS — Z00.00 WELL ADULT EXAM: Primary | ICD-10-CM

## 2020-09-14 DIAGNOSIS — M54.50 ACUTE LEFT-SIDED LOW BACK PAIN WITHOUT SCIATICA: ICD-10-CM

## 2020-09-14 DIAGNOSIS — Z86.16 HISTORY OF 2019 NOVEL CORONAVIRUS DISEASE (COVID-19): ICD-10-CM

## 2020-09-14 LAB
ALBUMIN SERPL-MCNC: 3.2 G/DL (ref 3.4–5)
ALBUMIN/GLOB SERPL: 0.9 {RATIO} (ref 1–2)
ALP LIVER SERPL-CCNC: 63 U/L (ref 39–100)
ALT SERPL-CCNC: 24 U/L (ref 13–56)
ANION GAP SERPL CALC-SCNC: 6 MMOL/L (ref 0–18)
APPEARANCE: CLEAR
AST SERPL-CCNC: 14 U/L (ref 15–37)
BASOPHILS # BLD AUTO: 0.03 X10(3) UL (ref 0–0.2)
BASOPHILS NFR BLD AUTO: 0.6 %
BILIRUB SERPL-MCNC: 0.3 MG/DL (ref 0.1–2)
BILIRUBIN: NEGATIVE
BUN BLD-MCNC: 11 MG/DL (ref 7–18)
BUN/CREAT SERPL: 15.1 (ref 10–20)
CALCIUM BLD-MCNC: 8.9 MG/DL (ref 8.5–10.1)
CHLORIDE SERPL-SCNC: 108 MMOL/L (ref 98–112)
CHOLEST SMN-MCNC: 229 MG/DL (ref ?–200)
CO2 SERPL-SCNC: 25 MMOL/L (ref 21–32)
CONTROL LINE PRESENT WITH A CLEAR BACKGROUND (YES/NO): YES YES/NO
CREAT BLD-MCNC: 0.73 MG/DL (ref 0.55–1.02)
DEPRECATED RDW RBC AUTO: 44.7 FL (ref 35.1–46.3)
EOSINOPHIL # BLD AUTO: 0.2 X10(3) UL (ref 0–0.7)
EOSINOPHIL NFR BLD AUTO: 4 %
ERYTHROCYTE [DISTWIDTH] IN BLOOD BY AUTOMATED COUNT: 13.4 % (ref 11–15)
GLOBULIN PLAS-MCNC: 3.6 G/DL (ref 2.8–4.4)
GLUCOSE (URINE DIPSTICK): NEGATIVE MG/DL
GLUCOSE BLD-MCNC: 86 MG/DL (ref 70–99)
HCT VFR BLD AUTO: 40 % (ref 35–48)
HDLC SERPL-MCNC: 60 MG/DL (ref 40–59)
HGB BLD-MCNC: 12.9 G/DL (ref 12–16)
IMM GRANULOCYTES # BLD AUTO: 0.02 X10(3) UL (ref 0–1)
IMM GRANULOCYTES NFR BLD: 0.4 %
KETONES (URINE DIPSTICK): NEGATIVE MG/DL
LDLC SERPL CALC-MCNC: 152 MG/DL (ref ?–100)
LEUKOCYTES: NEGATIVE
LYMPHOCYTES # BLD AUTO: 1.84 X10(3) UL (ref 1–4)
LYMPHOCYTES NFR BLD AUTO: 36.7 %
M PROTEIN MFR SERPL ELPH: 6.8 G/DL (ref 6.4–8.2)
MCH RBC QN AUTO: 29.4 PG (ref 26–34)
MCHC RBC AUTO-ENTMCNC: 32.3 G/DL (ref 31–37)
MCV RBC AUTO: 91.1 FL (ref 80–100)
MONOCYTES # BLD AUTO: 0.45 X10(3) UL (ref 0.1–1)
MONOCYTES NFR BLD AUTO: 9 %
MULTISTIX LOT#: NORMAL NUMERIC
NEUTROPHILS # BLD AUTO: 2.48 X10 (3) UL (ref 1.5–7.7)
NEUTROPHILS # BLD AUTO: 2.48 X10(3) UL (ref 1.5–7.7)
NEUTROPHILS NFR BLD AUTO: 49.3 %
NITRITE, URINE: NEGATIVE
NONHDLC SERPL-MCNC: 169 MG/DL (ref ?–130)
OCCULT BLOOD: NEGATIVE
OSMOLALITY SERPL CALC.SUM OF ELEC: 287 MOSM/KG (ref 275–295)
PATIENT FASTING Y/N/NP: YES
PATIENT FASTING Y/N/NP: YES
PH, URINE: 5 (ref 4.5–8)
PLATELET # BLD AUTO: 224 10(3)UL (ref 150–450)
POTASSIUM SERPL-SCNC: 4 MMOL/L (ref 3.5–5.1)
PREGNANCY TEST, URINE: NEGATIVE
PROTEIN (URINE DIPSTICK): NEGATIVE MG/DL
RBC # BLD AUTO: 4.39 X10(6)UL (ref 3.8–5.3)
SODIUM SERPL-SCNC: 139 MMOL/L (ref 136–145)
SPECIFIC GRAVITY: 1.02 (ref 1–1.03)
TRIGL SERPL-MCNC: 86 MG/DL (ref 30–149)
TSI SER-ACNC: 1.66 MIU/ML (ref 0.36–3.74)
URINE-COLOR: YELLOW
UROBILINOGEN,SEMI-QN: 0.2 MG/DL (ref 0–1.9)
VLDLC SERPL CALC-MCNC: 17 MG/DL (ref 0–30)
WBC # BLD AUTO: 5 X10(3) UL (ref 4–11)

## 2020-09-14 PROCEDURE — 81025 URINE PREGNANCY TEST: CPT | Performed by: FAMILY MEDICINE

## 2020-09-14 PROCEDURE — 3008F BODY MASS INDEX DOCD: CPT | Performed by: FAMILY MEDICINE

## 2020-09-14 PROCEDURE — 36415 COLL VENOUS BLD VENIPUNCTURE: CPT

## 2020-09-14 PROCEDURE — 3078F DIAST BP <80 MM HG: CPT | Performed by: FAMILY MEDICINE

## 2020-09-14 PROCEDURE — 99396 PREV VISIT EST AGE 40-64: CPT | Performed by: FAMILY MEDICINE

## 2020-09-14 PROCEDURE — 80061 LIPID PANEL: CPT

## 2020-09-14 PROCEDURE — 84443 ASSAY THYROID STIM HORMONE: CPT

## 2020-09-14 PROCEDURE — 85025 COMPLETE CBC W/AUTO DIFF WBC: CPT

## 2020-09-14 PROCEDURE — 3074F SYST BP LT 130 MM HG: CPT | Performed by: FAMILY MEDICINE

## 2020-09-14 PROCEDURE — 80053 COMPREHEN METABOLIC PANEL: CPT

## 2020-09-14 PROCEDURE — 99213 OFFICE O/P EST LOW 20 MIN: CPT | Performed by: FAMILY MEDICINE

## 2020-09-14 PROCEDURE — 81002 URINALYSIS NONAUTO W/O SCOPE: CPT | Performed by: FAMILY MEDICINE

## 2020-09-14 NOTE — PROGRESS NOTES
HPI:    Patient ID: Andres Cuello is a 50year old female. HPI  Patient presents with:  Routine Physical  Low Back Pain: left lower back since she was positive for covid-19 pain on and off     Patient overall states she is doing well.   She does mention h arthralgias. Skin: Negative for rash. Neurological: Negative for dizziness, seizures, syncope, weakness, numbness and headaches. Hematological: Does not bruise/bleed easily.    Psychiatric/Behavioral: Negative for behavioral problems, decreased concen on file        Forced sexual activity: Not on file    Other Topics      Concerns:         Service: Not Asked        Blood Transfusions: Not Asked        Caffeine Concern: Yes          coffee-1 cup/day        Occupational Exposure: Not Asked • clotrimazole-betamethasone (LOTRISONE) 1-0.05 % External Cream APPLY BID (Patient not taking: Reported on 9/14/2020 ) 45 g 0     Allergies:No Known Allergies   PHYSICAL EXAM:   Patient presents with:  Routine Physical  Low Back Pain: left lower back si low back pain without sciatica  Lower abdominal pain    1.  Well adult exam  Return yearly for physicals  Follow up with dentist every 6 months  Follow up with eye doctor yearly  Exercise for 30mins most days of the week  Yearly flu shot  Tetanus booster ev

## 2020-11-05 ENCOUNTER — TELEPHONE (OUTPATIENT)
Dept: FAMILY MEDICINE CLINIC | Facility: CLINIC | Age: 45
End: 2020-11-05

## 2020-11-05 DIAGNOSIS — Z12.31 ENCOUNTER FOR SCREENING MAMMOGRAM FOR BREAST CANCER: Primary | ICD-10-CM

## 2020-11-06 NOTE — TELEPHONE ENCOUNTER
I will go ahead and order the mammogram.  Please inform patient she is due for her gynecological exam she had a physical with me but she has not had a GYN exam

## 2020-11-09 NOTE — TELEPHONE ENCOUNTER
Spoke to patient to let her know her mammogram has been ordered.  She has an appointment with the gynecologist on 11/16/2020

## 2020-11-16 ENCOUNTER — OFFICE VISIT (OUTPATIENT)
Dept: FAMILY MEDICINE CLINIC | Facility: CLINIC | Age: 45
End: 2020-11-16
Payer: COMMERCIAL

## 2020-11-16 VITALS
HEIGHT: 64 IN | BODY MASS INDEX: 26 KG/M2 | DIASTOLIC BLOOD PRESSURE: 80 MMHG | HEART RATE: 80 BPM | SYSTOLIC BLOOD PRESSURE: 139 MMHG

## 2020-11-16 DIAGNOSIS — Z12.31 ENCOUNTER FOR SCREENING MAMMOGRAM FOR BREAST CANCER: ICD-10-CM

## 2020-11-16 DIAGNOSIS — N64.59 INVERSION OF LEFT NIPPLE: ICD-10-CM

## 2020-11-16 DIAGNOSIS — N92.6 IRREGULAR MENSES: ICD-10-CM

## 2020-11-16 DIAGNOSIS — L65.9 HAIR LOSS: ICD-10-CM

## 2020-11-16 DIAGNOSIS — Z01.419 ENCOUNTER FOR GYNECOLOGICAL EXAMINATION: Primary | ICD-10-CM

## 2020-11-16 DIAGNOSIS — N76.0 ACUTE VAGINITIS: ICD-10-CM

## 2020-11-16 PROCEDURE — 99072 ADDL SUPL MATRL&STAF TM PHE: CPT | Performed by: FAMILY MEDICINE

## 2020-11-16 PROCEDURE — 3075F SYST BP GE 130 - 139MM HG: CPT | Performed by: FAMILY MEDICINE

## 2020-11-16 PROCEDURE — 3079F DIAST BP 80-89 MM HG: CPT | Performed by: FAMILY MEDICINE

## 2020-11-16 PROCEDURE — 99396 PREV VISIT EST AGE 40-64: CPT | Performed by: FAMILY MEDICINE

## 2020-11-16 RX ORDER — FLUCONAZOLE 150 MG/1
150 TABLET ORAL ONCE
Qty: 1 TABLET | Refills: 0 | Status: SHIPPED | OUTPATIENT
Start: 2020-11-16 | End: 2020-11-16

## 2020-11-16 NOTE — PROGRESS NOTES
HPI:    Patient ID: Linda Tapia is a 50year old female. HPI  Patient presents with:  Gyn Exam  Hair/Scalp Problem: pt states her hair falls alot     Patient here for her gynecological exam.  Overall she feels well.   She states she got her flu shot at Not on file        Attends [employer] service: Not on file        Active member of club or organization: Not on file        Attends meetings of clubs or organizations: Not on file        Relationship status: Not on file      Intimate partner violence states her hair falls alot      Physical Exam   Constitutional: She appears well-developed and well-nourished. Neck: Normal range of motion. Neck supple. No thyromegaly present. Cardiovascular: Normal rate, regular rhythm and normal heart sounds.    Pul PAP WITH HPV REFLEX REQUEST B; Future    2. Irregular menses    Normal pelvic us 7/2018    3. Encounter for screening mammogram for breast cancer    - [facility] SG 2D+3D SCREENING BILAT (CPT=77067/46348); Future    4.  Acute vaginitis    - GENITAL VAGINOSIS SCR

## 2020-12-02 ENCOUNTER — HOSPITAL ENCOUNTER (OUTPATIENT)
Dept: MAMMOGRAPHY | Age: 45
Discharge: HOME | End: 2020-12-02
Attending: FAMILY MEDICINE
Payer: COMMERCIAL

## 2020-12-02 DIAGNOSIS — Z12.31 ENCOUNTER FOR SCREENING MAMMOGRAM FOR BREAST CANCER: ICD-10-CM

## 2020-12-02 PROCEDURE — 77067 SCR MAMMO BI INCL CAD: CPT | Performed by: FAMILY MEDICINE

## 2020-12-02 PROCEDURE — 77063 BREAST TOMOSYNTHESIS BI: CPT | Performed by: FAMILY MEDICINE

## 2021-03-09 ENCOUNTER — IMMUNIZATION (OUTPATIENT)
Dept: LAB | Age: 46
End: 2021-03-09
Attending: HOSPITALIST
Payer: COMMERCIAL

## 2021-03-09 DIAGNOSIS — Z23 NEED FOR VACCINATION: Primary | ICD-10-CM

## 2021-03-09 PROCEDURE — 0001A SARSCOV2 VAC 30MCG/0.3ML IM: CPT

## 2021-03-30 ENCOUNTER — IMMUNIZATION (OUTPATIENT)
Dept: LAB | Age: 46
End: 2021-03-30
Attending: HOSPITALIST
Payer: COMMERCIAL

## 2021-03-30 DIAGNOSIS — Z23 NEED FOR VACCINATION: Primary | ICD-10-CM

## 2021-03-30 PROCEDURE — 0002A SARSCOV2 VAC 30MCG/0.3ML IM: CPT

## 2021-11-01 ENCOUNTER — LAB ENCOUNTER (OUTPATIENT)
Dept: LAB | Age: 46
End: 2021-11-01
Attending: FAMILY MEDICINE
Payer: COMMERCIAL

## 2021-11-01 ENCOUNTER — OFFICE VISIT (OUTPATIENT)
Dept: FAMILY MEDICINE CLINIC | Facility: CLINIC | Age: 46
End: 2021-11-01
Payer: COMMERCIAL

## 2021-11-01 VITALS
BODY MASS INDEX: 25.78 KG/M2 | WEIGHT: 151 LBS | SYSTOLIC BLOOD PRESSURE: 136 MMHG | DIASTOLIC BLOOD PRESSURE: 82 MMHG | HEART RATE: 99 BPM | TEMPERATURE: 98 F | HEIGHT: 64 IN

## 2021-11-01 DIAGNOSIS — R73.9 HYPERGLYCEMIA: ICD-10-CM

## 2021-11-01 DIAGNOSIS — N92.6 IRREGULAR MENSES: ICD-10-CM

## 2021-11-01 DIAGNOSIS — Z12.31 ENCOUNTER FOR SCREENING MAMMOGRAM FOR BREAST CANCER: ICD-10-CM

## 2021-11-01 DIAGNOSIS — Z00.00 WELL ADULT EXAM: ICD-10-CM

## 2021-11-01 DIAGNOSIS — Z86.16 HISTORY OF 2019 NOVEL CORONAVIRUS DISEASE (COVID-19): ICD-10-CM

## 2021-11-01 DIAGNOSIS — L65.9 HAIR LOSS: ICD-10-CM

## 2021-11-01 DIAGNOSIS — Z00.00 WELL ADULT EXAM: Primary | ICD-10-CM

## 2021-11-01 DIAGNOSIS — L60.8 TOENAIL DEFORMITY: ICD-10-CM

## 2021-11-01 DIAGNOSIS — N64.59 INVERSION OF LEFT NIPPLE: ICD-10-CM

## 2021-11-01 DIAGNOSIS — Z12.11 COLON CANCER SCREENING: ICD-10-CM

## 2021-11-01 PROCEDURE — 84443 ASSAY THYROID STIM HORMONE: CPT

## 2021-11-01 PROCEDURE — 80053 COMPREHEN METABOLIC PANEL: CPT

## 2021-11-01 PROCEDURE — 99396 PREV VISIT EST AGE 40-64: CPT | Performed by: FAMILY MEDICINE

## 2021-11-01 PROCEDURE — 36415 COLL VENOUS BLD VENIPUNCTURE: CPT

## 2021-11-01 PROCEDURE — 82728 ASSAY OF FERRITIN: CPT

## 2021-11-01 PROCEDURE — 3008F BODY MASS INDEX DOCD: CPT | Performed by: FAMILY MEDICINE

## 2021-11-01 PROCEDURE — 83036 HEMOGLOBIN GLYCOSYLATED A1C: CPT

## 2021-11-01 PROCEDURE — 82670 ASSAY OF TOTAL ESTRADIOL: CPT

## 2021-11-01 PROCEDURE — 83540 ASSAY OF IRON: CPT

## 2021-11-01 PROCEDURE — 83002 ASSAY OF GONADOTROPIN (LH): CPT

## 2021-11-01 PROCEDURE — 3075F SYST BP GE 130 - 139MM HG: CPT | Performed by: FAMILY MEDICINE

## 2021-11-01 PROCEDURE — 81025 URINE PREGNANCY TEST: CPT | Performed by: FAMILY MEDICINE

## 2021-11-01 PROCEDURE — 84466 ASSAY OF TRANSFERRIN: CPT

## 2021-11-01 PROCEDURE — 3079F DIAST BP 80-89 MM HG: CPT | Performed by: FAMILY MEDICINE

## 2021-11-01 PROCEDURE — 85025 COMPLETE CBC W/AUTO DIFF WBC: CPT

## 2021-11-01 PROCEDURE — 80061 LIPID PANEL: CPT

## 2021-11-01 PROCEDURE — 83001 ASSAY OF GONADOTROPIN (FSH): CPT

## 2021-11-01 NOTE — PROGRESS NOTES
HPI:    Patient ID: Mago Viera is a 48year old female.     HPI  Patient presents with:  Routine Physical  No Period/no Cycle: for two months   Fungus Nails: toes     Normal pap 11/2020  Colonoscopy Never done  Annual Physical due on 11/16/2021  Mammogram decreased urine volume, difficulty urinating, dysuria, flank pain, frequency, menstrual problem, pelvic pain, urgency, vaginal bleeding, vaginal discharge and vaginal pain.         Irregular menses   Musculoskeletal: Negative for arthralgias, back pain and Not Asked        Pt has a pacemaker: Not Asked        Pt has a defibrillator: Not Asked        Breast feeding: Not Asked        Reaction to local anesthetic: No    Social History Narrative      Not on file    Social Determinants of [facility] 11/16/2023  Influenza Vaccine Completed  COVID-19 Vaccine Completed  Pneumococcal Vaccine: Birth to 56yrs Aged Out       No current outpatient medications on file.      Allergies:No Known Allergies   PHYSICAL EXAM:   Patient presents with:  Routine Physical Upper Body:      Right upper body: No supraclavicular or axillary adenopathy. Left upper body: No supraclavicular or axillary adenopathy. Skin:     General: Skin is dry. Findings: No rash.    Neurological:      Mental Status: She is alert a With Platelet      Comp Metabolic Panel (14)      Lipid Panel      TSH W Reflex To Free T4      Urine Pregnancy Test      LH (Luteinizing Hormone)      FSH [E]      Estradiol [E]      The above note was creating using Dragon speech recognition technology.

## 2021-11-02 DIAGNOSIS — E78.00 HYPERCHOLESTEREMIA: ICD-10-CM

## 2021-11-02 DIAGNOSIS — E78.00 HYPERCHOLESTEREMIA: Primary | ICD-10-CM

## 2021-11-02 DIAGNOSIS — R73.9 HYPERGLYCEMIA: Primary | ICD-10-CM

## 2021-11-05 NOTE — PROGRESS NOTES
Called spoke with pt in regards to lab results, notified pt dr Jair Moulton states a1c test shows no diabetes, pt understood verbal

## 2021-12-01 ENCOUNTER — OFFICE VISIT (OUTPATIENT)
Dept: OBGYN CLINIC | Facility: CLINIC | Age: 46
End: 2021-12-01
Payer: COMMERCIAL

## 2021-12-01 VITALS
DIASTOLIC BLOOD PRESSURE: 75 MMHG | BODY MASS INDEX: 26 KG/M2 | SYSTOLIC BLOOD PRESSURE: 130 MMHG | WEIGHT: 151.63 LBS | HEART RATE: 74 BPM

## 2021-12-01 DIAGNOSIS — N95.1 PERIMENOPAUSE: Primary | ICD-10-CM

## 2021-12-01 PROCEDURE — 99202 OFFICE O/P NEW SF 15 MIN: CPT | Performed by: OBSTETRICS & GYNECOLOGY

## 2021-12-01 PROCEDURE — 3078F DIAST BP <80 MM HG: CPT | Performed by: OBSTETRICS & GYNECOLOGY

## 2021-12-01 PROCEDURE — 3075F SYST BP GE 130 - 139MM HG: CPT | Performed by: OBSTETRICS & GYNECOLOGY

## 2021-12-02 NOTE — PROGRESS NOTES
Lizbet Prince is a 48year old female [id number] Patient's last menstrual period was 11/17/2021 (approximate). Patient presents with:  Gyn Problem: C/O IRREGULAR PERIODS     New pt. Has periods q 2 months x5 days. Had no period since 8/13/21.   However, after

## 2021-12-05 ENCOUNTER — HOSPITAL ENCOUNTER (OUTPATIENT)
Dept: ULTRASOUND IMAGING | Age: 46
Discharge: HOME | End: 2021-12-05
Attending: FAMILY MEDICINE
Payer: COMMERCIAL

## 2021-12-05 DIAGNOSIS — N92.6 IRREGULAR MENSES: ICD-10-CM

## 2021-12-05 PROCEDURE — 76830 TRANSVAGINAL US NON-OB: CPT | Performed by: FAMILY MEDICINE

## 2021-12-05 PROCEDURE — 76856 US EXAM PELVIC COMPLETE: CPT | Performed by: FAMILY MEDICINE

## 2021-12-17 ENCOUNTER — OFFICE VISIT (OUTPATIENT)
Dept: PODIATRY CLINIC | Facility: CLINIC | Age: 46
End: 2021-12-17
Payer: COMMERCIAL

## 2021-12-17 DIAGNOSIS — B35.1 NAIL FUNGUS: Primary | ICD-10-CM

## 2021-12-17 PROCEDURE — 99203 OFFICE O/P NEW LOW 30 MIN: CPT | Performed by: PODIATRIST

## 2021-12-20 NOTE — PROGRESS NOTES
Roxana Ramos is a 48year old female. Patient presents with:  New Patient: right hallux fungus. Has used OTC creams with no results.  fungus occurs off and on for years        HPI:   Patient presents to the clinic with a chief complaint of a deformed right and equivocal bilateral   3. Neurologic: The patient has intact sensorium there is no deficits noted   4. Musculoskeletal: The patient has good muscle strength.     ASSESSMENT AND PLAN:   Diagnoses and all orders for this visit:    Nail fungus  -     FUNGUS

## 2022-01-20 ENCOUNTER — TELEPHONE (OUTPATIENT)
Dept: PODIATRY CLINIC | Facility: CLINIC | Age: 47
End: 2022-01-20

## 2022-03-21 ENCOUNTER — LAB ENCOUNTER (OUTPATIENT)
Dept: LAB | Facility: HOSPITAL | Age: 47
End: 2022-03-21
Attending: PODIATRIST
Payer: COMMERCIAL

## 2022-03-21 ENCOUNTER — OFFICE VISIT (OUTPATIENT)
Dept: PODIATRY CLINIC | Facility: CLINIC | Age: 47
End: 2022-03-21
Payer: COMMERCIAL

## 2022-03-21 DIAGNOSIS — B35.1 ONYCHOMYCOSIS: Primary | ICD-10-CM

## 2022-03-21 DIAGNOSIS — E78.00 HYPERCHOLESTEREMIA: ICD-10-CM

## 2022-03-21 DIAGNOSIS — B35.1 ONYCHOMYCOSIS: ICD-10-CM

## 2022-03-21 LAB
ALBUMIN SERPL-MCNC: 3.6 G/DL (ref 3.4–5)
ALBUMIN/GLOB SERPL: 1.1 {RATIO} (ref 1–2)
ALP LIVER SERPL-CCNC: 96 U/L
ALT SERPL-CCNC: 30 U/L
ANION GAP SERPL CALC-SCNC: 6 MMOL/L (ref 0–18)
AST SERPL-CCNC: 21 U/L (ref 15–37)
BILIRUB DIRECT SERPL-MCNC: <0.1 MG/DL (ref 0–0.2)
BILIRUB SERPL-MCNC: 0.2 MG/DL (ref 0.1–2)
BUN BLD-MCNC: 12 MG/DL (ref 7–18)
BUN/CREAT SERPL: 16.4 (ref 10–20)
CALCIUM BLD-MCNC: 8.2 MG/DL (ref 8.5–10.1)
CHLORIDE SERPL-SCNC: 106 MMOL/L (ref 98–112)
CHOLEST SERPL-MCNC: 259 MG/DL (ref ?–200)
CO2 SERPL-SCNC: 26 MMOL/L (ref 21–32)
CREAT BLD-MCNC: 0.73 MG/DL
FASTING PATIENT LIPID ANSWER: YES
FASTING STATUS PATIENT QL REPORTED: YES
GLOBULIN PLAS-MCNC: 3.3 G/DL (ref 2.8–4.4)
GLUCOSE BLD-MCNC: 103 MG/DL (ref 70–99)
HDLC SERPL-MCNC: 55 MG/DL (ref 40–59)
LDLC SERPL CALC-MCNC: 105 MG/DL (ref ?–100)
NONHDLC SERPL-MCNC: 204 MG/DL (ref ?–130)
OSMOLALITY SERPL CALC.SUM OF ELEC: 286 MOSM/KG (ref 275–295)
POTASSIUM SERPL-SCNC: 4.1 MMOL/L (ref 3.5–5.1)
PROT SERPL-MCNC: 6.9 G/DL (ref 6.4–8.2)
SODIUM SERPL-SCNC: 138 MMOL/L (ref 136–145)
TRIGL SERPL-MCNC: 581 MG/DL (ref 30–149)
VLDLC SERPL CALC-MCNC: 102 MG/DL (ref 0–30)

## 2022-03-21 PROCEDURE — 80053 COMPREHEN METABOLIC PANEL: CPT

## 2022-03-21 PROCEDURE — 36415 COLL VENOUS BLD VENIPUNCTURE: CPT

## 2022-03-21 PROCEDURE — 82248 BILIRUBIN DIRECT: CPT

## 2022-03-21 PROCEDURE — 80061 LIPID PANEL: CPT

## 2022-03-21 PROCEDURE — 99213 OFFICE O/P EST LOW 20 MIN: CPT | Performed by: PODIATRIST

## 2022-03-28 ENCOUNTER — TELEPHONE (OUTPATIENT)
Dept: PODIATRY CLINIC | Facility: CLINIC | Age: 47
End: 2022-03-28

## 2022-03-28 RX ORDER — TERBINAFINE HYDROCHLORIDE 250 MG/1
250 TABLET ORAL DAILY
Qty: 45 TABLET | Refills: 0 | Status: SHIPPED | OUTPATIENT
Start: 2022-03-28

## 2022-03-28 NOTE — TELEPHONE ENCOUNTER
Patient is calling for results, waiting to hear if she will be prescribed something for her fungal infection.  Pls advise

## 2022-03-28 NOTE — TELEPHONE ENCOUNTER
Please review CMP ordered by CMP. Your note says to route to Dr. Brynn Hernandez. Patient has not seen Dr. Brynn Hernandez before. Patient requesting antifungal medication be ordered.

## 2022-04-04 NOTE — TELEPHONE ENCOUNTER
Per pt is states pharmacy has not received medication. States it has been 3 weeks and issue has not gotten better.  Please advise

## 2022-04-11 ENCOUNTER — OFFICE VISIT (OUTPATIENT)
Dept: FAMILY MEDICINE CLINIC | Facility: CLINIC | Age: 47
End: 2022-04-11
Payer: COMMERCIAL

## 2022-04-11 VITALS
BODY MASS INDEX: 26.98 KG/M2 | WEIGHT: 158 LBS | SYSTOLIC BLOOD PRESSURE: 128 MMHG | TEMPERATURE: 98 F | HEIGHT: 64 IN | DIASTOLIC BLOOD PRESSURE: 84 MMHG | HEART RATE: 69 BPM

## 2022-04-11 DIAGNOSIS — E66.3 OVERWEIGHT (BMI 25.0-29.9): ICD-10-CM

## 2022-04-11 DIAGNOSIS — Z63.0 MARITAL PROBLEMS: ICD-10-CM

## 2022-04-11 DIAGNOSIS — R45.89 DEPRESSED MOOD: ICD-10-CM

## 2022-04-11 DIAGNOSIS — E78.00 HYPERCHOLESTEREMIA: Primary | ICD-10-CM

## 2022-04-11 PROCEDURE — 3074F SYST BP LT 130 MM HG: CPT | Performed by: FAMILY MEDICINE

## 2022-04-11 PROCEDURE — 3008F BODY MASS INDEX DOCD: CPT | Performed by: FAMILY MEDICINE

## 2022-04-11 PROCEDURE — 99214 OFFICE O/P EST MOD 30 MIN: CPT | Performed by: FAMILY MEDICINE

## 2022-04-11 PROCEDURE — 3079F DIAST BP 80-89 MM HG: CPT | Performed by: FAMILY MEDICINE

## 2022-04-11 SDOH — SOCIAL STABILITY - SOCIAL INSECURITY: PROBLEMS IN RELATIONSHIP WITH SPOUSE OR PARTNER: Z63.0

## 2022-05-11 ENCOUNTER — HOSPITAL ENCOUNTER (OUTPATIENT)
Dept: MAMMOGRAPHY | Age: 47
Discharge: HOME | End: 2022-05-11
Attending: FAMILY MEDICINE
Payer: COMMERCIAL

## 2022-05-11 DIAGNOSIS — Z12.31 ENCOUNTER FOR SCREENING MAMMOGRAM FOR BREAST CANCER: ICD-10-CM

## 2022-05-11 PROCEDURE — 77067 SCR MAMMO BI INCL CAD: CPT | Performed by: FAMILY MEDICINE

## 2022-05-11 PROCEDURE — 77063 BREAST TOMOSYNTHESIS BI: CPT | Performed by: FAMILY MEDICINE

## 2022-05-26 ENCOUNTER — OFFICE VISIT (OUTPATIENT)
Dept: PODIATRY CLINIC | Facility: CLINIC | Age: 47
End: 2022-05-26
Payer: COMMERCIAL

## 2022-05-26 DIAGNOSIS — M20.41 HAMMER TOE OF RIGHT FOOT: ICD-10-CM

## 2022-05-26 DIAGNOSIS — B35.1 ONYCHOMYCOSIS: Primary | ICD-10-CM

## 2022-05-26 DIAGNOSIS — L84 HELOMA MOLLE: ICD-10-CM

## 2022-05-26 PROCEDURE — 99213 OFFICE O/P EST LOW 20 MIN: CPT | Performed by: PODIATRIST

## 2022-06-03 ENCOUNTER — OFFICE VISIT (OUTPATIENT)
Dept: FAMILY MEDICINE CLINIC | Facility: CLINIC | Age: 47
End: 2022-06-03
Payer: COMMERCIAL

## 2022-06-03 VITALS
BODY MASS INDEX: 25.1 KG/M2 | SYSTOLIC BLOOD PRESSURE: 126 MMHG | WEIGHT: 147 LBS | DIASTOLIC BLOOD PRESSURE: 84 MMHG | HEIGHT: 64 IN | HEART RATE: 81 BPM

## 2022-06-03 DIAGNOSIS — R05.9 COUGH: Primary | ICD-10-CM

## 2022-06-03 PROCEDURE — 3079F DIAST BP 80-89 MM HG: CPT | Performed by: PHYSICIAN ASSISTANT

## 2022-06-03 PROCEDURE — 3008F BODY MASS INDEX DOCD: CPT | Performed by: PHYSICIAN ASSISTANT

## 2022-06-03 PROCEDURE — 3074F SYST BP LT 130 MM HG: CPT | Performed by: PHYSICIAN ASSISTANT

## 2022-06-03 PROCEDURE — 99213 OFFICE O/P EST LOW 20 MIN: CPT | Performed by: PHYSICIAN ASSISTANT

## 2022-06-03 RX ORDER — BENZONATATE 200 MG/1
200 CAPSULE ORAL 3 TIMES DAILY PRN
Qty: 30 CAPSULE | Refills: 0 | Status: SHIPPED | OUTPATIENT
Start: 2022-06-03

## 2022-06-08 ENCOUNTER — LAB ENCOUNTER (OUTPATIENT)
Dept: LAB | Facility: HOSPITAL | Age: 47
End: 2022-06-08
Attending: PODIATRIST
Payer: COMMERCIAL

## 2022-06-08 DIAGNOSIS — B35.1 ONYCHOMYCOSIS: ICD-10-CM

## 2022-06-08 LAB
ALBUMIN SERPL-MCNC: 3.5 G/DL (ref 3.4–5)
ALP LIVER SERPL-CCNC: 73 U/L
ALT SERPL-CCNC: 27 U/L
AST SERPL-CCNC: 18 U/L (ref 15–37)
BILIRUB DIRECT SERPL-MCNC: <0.1 MG/DL (ref 0–0.2)
BILIRUB SERPL-MCNC: 0.2 MG/DL (ref 0.1–2)
PROT SERPL-MCNC: 7.3 G/DL (ref 6.4–8.2)

## 2022-06-08 PROCEDURE — 36415 COLL VENOUS BLD VENIPUNCTURE: CPT

## 2022-06-08 PROCEDURE — 80076 HEPATIC FUNCTION PANEL: CPT

## 2022-06-10 ENCOUNTER — TELEPHONE (OUTPATIENT)
Dept: PODIATRY CLINIC | Facility: CLINIC | Age: 47
End: 2022-06-10

## 2022-06-10 RX ORDER — TERBINAFINE HYDROCHLORIDE 250 MG/1
250 TABLET ORAL DAILY
Qty: 45 TABLET | Refills: 0 | Status: SHIPPED | OUTPATIENT
Start: 2022-06-10 | End: 2023-01-09

## 2022-07-11 ENCOUNTER — HOSPITAL ENCOUNTER (OUTPATIENT)
Dept: GENERAL RADIOLOGY | Age: 47
Discharge: HOME | End: 2022-07-11
Attending: FAMILY MEDICINE
Payer: COMMERCIAL

## 2022-07-11 ENCOUNTER — OFFICE VISIT (OUTPATIENT)
Dept: FAMILY MEDICINE CLINIC | Facility: CLINIC | Age: 47
End: 2022-07-11
Payer: COMMERCIAL

## 2022-07-11 VITALS
HEIGHT: 64 IN | WEIGHT: 153 LBS | SYSTOLIC BLOOD PRESSURE: 118 MMHG | DIASTOLIC BLOOD PRESSURE: 66 MMHG | HEART RATE: 75 BPM | BODY MASS INDEX: 26.12 KG/M2

## 2022-07-11 DIAGNOSIS — S82.891A CLOSED FRACTURE OF RIGHT ANKLE, INITIAL ENCOUNTER: ICD-10-CM

## 2022-07-11 DIAGNOSIS — S93.401A SPRAIN OF RIGHT ANKLE, UNSPECIFIED LIGAMENT, INITIAL ENCOUNTER: Primary | ICD-10-CM

## 2022-07-11 DIAGNOSIS — Z12.11 ENCOUNTER FOR SCREENING COLONOSCOPY: ICD-10-CM

## 2022-07-11 DIAGNOSIS — S93.401A SPRAIN OF RIGHT ANKLE, UNSPECIFIED LIGAMENT, INITIAL ENCOUNTER: ICD-10-CM

## 2022-07-11 PROCEDURE — 73610 X-RAY EXAM OF ANKLE: CPT | Performed by: FAMILY MEDICINE

## 2022-07-11 NOTE — PROGRESS NOTES
Blood pressure 118/66, pulse 75, height 5' 4\" (1.626 m), weight 153 lb (69.4 kg), last menstrual period 04/06/2022, not currently breastfeeding. Right ankle sprain 2 days ago. Felt a pop lots of swelling. Swelling has reduced. Has crutches not using them.   Patient describes an inversion injury of the right ankle    Objective right ankle with noticeable swelling noted tenderness at the talus    No tenderness at the malleoli    No fifth metatarsal tenderness    Assessment ankle sprain possible fracture    Plan right ankle x-ray ordered    Showed avulsion fracture referral to orthopedics no weightbearing note for work

## 2022-07-15 ENCOUNTER — TELEPHONE (OUTPATIENT)
Dept: FAMILY MEDICINE CLINIC | Facility: CLINIC | Age: 47
End: 2022-07-15

## 2022-07-15 NOTE — TELEPHONE ENCOUNTER
Patient is calling to request a new orthopedic referral as the authorized referral issued from office visit with Dr. Maryana Costello on 7/11/22, this provider has a 1st appointment available until 7/27//22 and patient states she should not wait until this date per xray results. Name of specialist and specialty department :  Orthopaedic    Reason for visit with the specialist:  Right foot    Address of the specialist office: Any    Appointment date:  7/27/22         CSS informed patient the turnaround time for referral is 5-7 business days. Patient was informed to check their [employer] account for referral status.

## 2022-10-23 ENCOUNTER — IMMUNIZATION (OUTPATIENT)
Age: 47
End: 2022-10-23
Attending: PREVENTIVE MEDICINE
Payer: COMMERCIAL

## 2022-10-23 DIAGNOSIS — Z23 NEED FOR VACCINATION: Primary | ICD-10-CM

## 2022-10-23 PROCEDURE — 90471 IMMUNIZATION ADMIN: CPT

## 2023-01-09 ENCOUNTER — OFFICE VISIT (OUTPATIENT)
Dept: FAMILY MEDICINE CLINIC | Facility: CLINIC | Age: 48
End: 2023-01-09
Payer: COMMERCIAL

## 2023-01-09 VITALS
HEIGHT: 64 IN | DIASTOLIC BLOOD PRESSURE: 68 MMHG | TEMPERATURE: 98 F | RESPIRATION RATE: 16 BRPM | BODY MASS INDEX: 26.8 KG/M2 | SYSTOLIC BLOOD PRESSURE: 112 MMHG | WEIGHT: 157 LBS | HEART RATE: 79 BPM

## 2023-01-09 DIAGNOSIS — H11.31 SUBCONJUNCTIVAL HEMORRHAGE OF RIGHT EYE: Primary | ICD-10-CM

## 2023-01-09 PROCEDURE — 3078F DIAST BP <80 MM HG: CPT | Performed by: FAMILY MEDICINE

## 2023-01-09 PROCEDURE — 3008F BODY MASS INDEX DOCD: CPT | Performed by: FAMILY MEDICINE

## 2023-01-09 PROCEDURE — 99213 OFFICE O/P EST LOW 20 MIN: CPT | Performed by: FAMILY MEDICINE

## 2023-01-09 PROCEDURE — 3074F SYST BP LT 130 MM HG: CPT | Performed by: FAMILY MEDICINE

## 2023-01-10 ENCOUNTER — TELEPHONE (OUTPATIENT)
Dept: OPHTHALMOLOGY | Facility: CLINIC | Age: 48
End: 2023-01-10

## 2023-01-10 NOTE — TELEPHONE ENCOUNTER
Cook Islander speaking pt on the line - she states she needs urgent appt today for eye being red and swollen - blood in tears last night

## 2023-01-10 NOTE — TELEPHONE ENCOUNTER
Spoke to patient via language line ID # [id number]. Pt complains of right eye red x since Friday 1/6/2022. Pt denies pain or blurry vision. Pt was seen by Dr Beverly Landaverde yesterday and was told she has a subconjunctival hemorrhage right eye. Pt was given a referral to ophthalmology per Dr Beverly Landaverde due to this happening more a multiple times last year as well. Let patient know that this usually goes away on it's own, there is normally no treatment. Sometimes can look worse before ut gets better. Pt scheduled for an office visit Thurs 1/12/23 @ 11:00am and will call back to cancel if she is better before her appointment.

## 2023-01-12 ENCOUNTER — OFFICE VISIT (OUTPATIENT)
Dept: OPHTHALMOLOGY | Facility: CLINIC | Age: 48
End: 2023-01-12

## 2023-01-12 DIAGNOSIS — H11.31 SUBCONJUNCTIVAL HEMORRHAGE OF RIGHT EYE: Primary | ICD-10-CM

## 2023-01-12 PROCEDURE — 99203 OFFICE O/P NEW LOW 30 MIN: CPT | Performed by: OPHTHALMOLOGY

## 2023-01-12 NOTE — ASSESSMENT & PLAN NOTE
Discussed diagnosis with patient. No treatment is needed at this time. Patient was reassured that there is no scratch, infection, foreign body or inflammation in the eye. Told  patient that this may take up to  1-2 weeks to resolve. Patient denies any bruising or bleeding disorder. Patient instructed that they can use over the counter artificial tears as needed for ocular comfort while this is resolving. Subconjunctival hemmorhage information  given. Patient should call office and make return appointment if symptoms worsen or do not completely resolve.

## 2023-01-12 NOTE — PATIENT INSTRUCTIONS
Subconjunctival hemorrhage of right eye  Discussed diagnosis with patient. No treatment is needed at this time. Patient was reassured that there is no scratch, infection, foreign body or inflammation in the eye. Told  patient that this may take up to  1-2 weeks to resolve. Patient denies any bruising or bleeding disorder. Patient instructed that they can use over the counter artificial tears as needed for ocular comfort while this is resolving. Subconjunctival hemmorhage information  given. Patient should call office and make return appointment if symptoms worsen or do not completely resolve.

## 2025-06-02 ENCOUNTER — OFFICE VISIT (OUTPATIENT)
Dept: FAMILY MEDICINE CLINIC | Facility: CLINIC | Age: 50
End: 2025-06-02

## 2025-06-02 VITALS
HEART RATE: 78 BPM | HEIGHT: 64 IN | WEIGHT: 163 LBS | BODY MASS INDEX: 27.83 KG/M2 | DIASTOLIC BLOOD PRESSURE: 75 MMHG | SYSTOLIC BLOOD PRESSURE: 124 MMHG

## 2025-06-02 DIAGNOSIS — R05.1 ACUTE COUGH: Primary | ICD-10-CM

## 2025-06-02 PROCEDURE — 99213 OFFICE O/P EST LOW 20 MIN: CPT | Performed by: PHYSICIAN ASSISTANT

## 2025-06-02 PROCEDURE — 3074F SYST BP LT 130 MM HG: CPT | Performed by: PHYSICIAN ASSISTANT

## 2025-06-02 PROCEDURE — 3008F BODY MASS INDEX DOCD: CPT | Performed by: PHYSICIAN ASSISTANT

## 2025-06-02 PROCEDURE — 3078F DIAST BP <80 MM HG: CPT | Performed by: PHYSICIAN ASSISTANT

## 2025-06-02 RX ORDER — BENZONATATE 200 MG/1
200 CAPSULE ORAL 3 TIMES DAILY PRN
Qty: 30 CAPSULE | Refills: 0 | Status: SHIPPED | OUTPATIENT
Start: 2025-06-02

## 2025-06-02 NOTE — PROGRESS NOTES
HPI:       The following individual(s) verbally consented to be recorded using ambient AI listening technology and understand that they can each withdraw their consent to this listening technology at any point by asking the clinician to turn off or pause the recording:    Patient name: Cece Bronson  Additional names:  none    Cough  Associated symptoms include ear pain.   Ear Pain   Associated symptoms include coughing.     History of Present Illness  Cece Bronson is a 49 year old female who presents with persistent cough and ear pain for eight days.    She experiences a persistent cough every five minutes with significant phlegm production, disrupting her sleep for three nights. Theraflu has not provided relief. She also has ear pain and a sensation of pressure. There is no sore throat, headache, nausea, vomiting, diarrhea, shortness of breath, or chest pain. Occasionally, she has a runny nose.      Medications:   Current Medications[1]    Allergies:   Allergies[2]    History:     Health Maintenance   Topic Date Due    Annual Physical  Never done    Colorectal Cancer Screening  Never done    DTaP,Tdap,and Td Vaccines (1 - Tdap) Never done    Mammogram  05/11/2023    Pneumococcal Vaccine: 50+ Years (1 of 1 - PCV) Never done    Zoster Vaccines (1 of 2) Never done    Pap Smear  11/16/2023    COVID-19 Vaccine (3 - 2024-25 season) 09/01/2024    Annual Depression Screening  01/01/2025    Influenza Vaccine (Season Ended) 10/01/2025    Meningococcal B Vaccine  Aged Out       No LMP recorded. (Menstrual status: Irregular Periods).   Past Medical History:   Past Medical History[3]    Past Surgical History:   Past Surgical History[4]    Family History:   Family History[5]    Social History:     Social History     Socioeconomic History    Marital status:      Spouse name: Not on file    Number of children: Not on file    Years of education: Not on file    Highest education level: Not on file   Occupational History     Not on file   Tobacco Use    Smoking status: Never    Smokeless tobacco: Never   Substance and Sexual Activity    Alcohol use: No    Drug use: No    Sexual activity: Yes     Partners: Male   Other Topics Concern     Service Not Asked    Blood Transfusions Not Asked    Caffeine Concern Yes     Comment: coffee-1 cup/day    Occupational Exposure Not Asked    Hobby Hazards Not Asked    Sleep Concern Not Asked    Stress Concern Not Asked    Weight Concern Not Asked    Special Diet Not Asked    Back Care Not Asked    Exercise Not Asked    Bike Helmet Not Asked    Seat Belt Not Asked    Self-Exams Not Asked    Grew up on a farm Not Asked    History of tanning Not Asked    Outdoor occupation Not Asked    Pt has a pacemaker Not Asked    Pt has a defibrillator Not Asked    Breast feeding Not Asked    Reaction to local anesthetic No   Social History Narrative    Not on file     Social Drivers of Health     Food Insecurity: Not on file   Transportation Needs: Not on file   Stress: Not on file   Housing Stability: Not on file       Review of Systems:   Review of Systems   HENT:  Positive for ear pain.    Respiratory:  Positive for cough.         Vitals:    06/02/25 0913   BP: 124/75   Pulse: 78   Weight: 163 lb (73.9 kg)   Height: 5' 4\" (1.626 m)     Body mass index is 27.98 kg/m².    Physical Exam:   Physical Exam  Vitals reviewed.   Constitutional:       General: She is not in acute distress.     Appearance: She is well-developed.   HENT:      Right Ear: Tympanic membrane, ear canal and external ear normal. There is no impacted cerumen.      Left Ear: Tympanic membrane, ear canal and external ear normal. There is no impacted cerumen.      Nose: Nose normal.      Mouth/Throat:      Mouth: Mucous membranes are moist.      Pharynx: Oropharynx is clear. No oropharyngeal exudate or posterior oropharyngeal erythema.   Eyes:      General:         Right eye: No discharge.         Left eye: No discharge.       Conjunctiva/sclera: Conjunctivae normal.   Cardiovascular:      Rate and Rhythm: Normal rate and regular rhythm.      Heart sounds: Normal heart sounds, S1 normal and S2 normal. No murmur heard.  Pulmonary:      Effort: Pulmonary effort is normal.      Breath sounds: Normal breath sounds. No wheezing or rales.   Chest:      Chest wall: No tenderness.   Lymphadenopathy:      Cervical: No cervical adenopathy.   Skin:     Findings: No rash.   Neurological:      Mental Status: She is alert and oriented to person, place, and time.   Psychiatric:         Behavior: Behavior is cooperative.          Assessment and Plan::     Assessment & Plan  Acute cough    Orders:    Benzonatate; Take 1 capsule (200 mg total) by mouth 3 (three) times daily as needed for cough.  Dispense: 30 capsule; Refill: 0       Discussed plan of care with pt and pt is in agreement.All questions answered. Pt to call with questions or concerns.         [1]   Current Outpatient Medications   Medication Sig Dispense Refill    benzonatate 200 MG Oral Cap Take 1 capsule (200 mg total) by mouth 3 (three) times daily as needed for cough. 30 capsule 0   [2] No Known Allergies  [3] History reviewed. No pertinent past medical history.  [4] History reviewed. No pertinent surgical history.  [5]   Family History  Problem Relation Age of Onset    Heart Disease Father     Diabetes Neg         family h/o    Breast Cancer Neg     Ovarian Cancer Neg     Glaucoma Neg     Macular degeneration Neg

## 2025-06-04 ENCOUNTER — NURSE TRIAGE (OUTPATIENT)
Dept: FAMILY MEDICINE CLINIC | Facility: CLINIC | Age: 50
End: 2025-06-04

## 2025-06-04 NOTE — TELEPHONE ENCOUNTER
Future Appointments   Date Time Provider Department Center   6/5/2025 10:45 AM Nguyen, Minhxuyen, PA-C [facility] [place]   6/11/2025  9:30 AM Efra Chadwick PA-C [facility] [place]

## 2025-06-04 NOTE — TELEPHONE ENCOUNTER
Action Requested: Summary for Provider     []  Critical Lab, Recommendations Needed  [] Need Additional Advice  []   FYI    []   Need Orders  [] Need Medications Sent to Pharmacy  []  Other     SUMMARY: Office visit    Future Appointments   Date Time Provider Department Center   6/5/2025 10:45 AM Efra Chadwick PA-C [facility] [place]   6/11/2025  9:30 AM [facility] [place]     Reason for call: Urinary Symptoms  Onset: Data Unavailable    Patient had a low grade fever yesterday. She still has a cough. But now she is having pain when she urinates. Denies bleeding, trouble urinating or severe pain.     Reason for Disposition   Age > 50 years    Protocols used: Urination Pain - Female-A-[place]

## 2025-06-05 ENCOUNTER — OFFICE VISIT (OUTPATIENT)
Dept: FAMILY MEDICINE CLINIC | Facility: CLINIC | Age: 50
End: 2025-06-05

## 2025-06-05 VITALS
DIASTOLIC BLOOD PRESSURE: 82 MMHG | WEIGHT: 162 LBS | BODY MASS INDEX: 28 KG/M2 | SYSTOLIC BLOOD PRESSURE: 126 MMHG | HEART RATE: 89 BPM

## 2025-06-05 DIAGNOSIS — R82.90 ABNORMAL URINALYSIS: ICD-10-CM

## 2025-06-05 DIAGNOSIS — N30.00 ACUTE CYSTITIS WITHOUT HEMATURIA: Primary | ICD-10-CM

## 2025-06-05 DIAGNOSIS — J01.00 ACUTE NON-RECURRENT MAXILLARY SINUSITIS: ICD-10-CM

## 2025-06-05 DIAGNOSIS — T14.8XXA SKIN ABRASION: ICD-10-CM

## 2025-06-05 DIAGNOSIS — R30.9 PAINFUL URINATION: ICD-10-CM

## 2025-06-05 LAB
BILIRUBIN: NEGATIVE
GLUCOSE (URINE DIPSTICK): NEGATIVE MG/DL
KETONES (URINE DIPSTICK): NEGATIVE MG/DL
MULTISTIX LOT#: ABNORMAL NUMERIC
NITRITE, URINE: NEGATIVE
PH, URINE: 6 (ref 4.5–8)
PROTEIN (URINE DIPSTICK): 100 MG/DL
SPECIFIC GRAVITY: 1.02 (ref 1–1.03)
UROBILINOGEN,SEMI-QN: 0.2 MG/DL (ref 0–1.9)

## 2025-06-05 PROCEDURE — 3079F DIAST BP 80-89 MM HG: CPT | Performed by: PHYSICIAN ASSISTANT

## 2025-06-05 PROCEDURE — 81003 URINALYSIS AUTO W/O SCOPE: CPT | Performed by: PHYSICIAN ASSISTANT

## 2025-06-05 PROCEDURE — 99213 OFFICE O/P EST LOW 20 MIN: CPT | Performed by: PHYSICIAN ASSISTANT

## 2025-06-05 PROCEDURE — 3074F SYST BP LT 130 MM HG: CPT | Performed by: PHYSICIAN ASSISTANT

## 2025-06-05 RX ORDER — CEPHALEXIN 500 MG/1
500 CAPSULE ORAL 3 TIMES DAILY
Qty: 21 CAPSULE | Refills: 0 | Status: SHIPPED | OUTPATIENT
Start: 2025-06-05 | End: 2025-06-12

## 2025-06-05 RX ORDER — MUPIROCIN 20 MG/G
OINTMENT TOPICAL
Qty: 30 G | Refills: 0 | Status: SHIPPED | OUTPATIENT
Start: 2025-06-05

## 2025-06-05 RX ORDER — NITROFURANTOIN 25; 75 MG/1; MG/1
100 CAPSULE ORAL 2 TIMES DAILY
Qty: 10 CAPSULE | Refills: 0 | Status: SHIPPED | OUTPATIENT
Start: 2025-06-05 | End: 2025-06-05

## 2025-06-05 NOTE — PROGRESS NOTES
HPI:     Dysuria   This is a new problem. Episode onset: 3 days. The problem has been gradually worsening. The quality of the pain is described as burning and aching. There has been no fever. There is No history of pyelonephritis. Associated symptoms include frequency. Pertinent negatives include no chills, discharge, flank pain, hematuria, nausea or vomiting. She has tried increased fluids for the symptoms.   She also has a sinus pressure, headache and pain for the past week.     Medications:   Current Medications[1]    Allergies:   Allergies[2]    History:     Health Maintenance   Topic Date Due    Annual Physical  Never done    Colorectal Cancer Screening  Never done    DTaP,Tdap,and Td Vaccines (1 - Tdap) Never done    Mammogram  05/11/2023    Pneumococcal Vaccine: 50+ Years (1 of 1 - PCV) Never done    Zoster Vaccines (1 of 2) Never done    Pap Smear  11/16/2023    COVID-19 Vaccine (3 - 2024-25 season) 09/01/2024    Annual Depression Screening  01/01/2025    Influenza Vaccine (Season Ended) 10/01/2025    Meningococcal B Vaccine  Aged Out       No LMP recorded. (Menstrual status: Menopause).   Past Medical History:   Past Medical History[3]    Past Surgical History:   Past Surgical History[4]    Family History:   Family History[5]    Social History:     Social History     Socioeconomic History    Marital status:      Spouse name: Not on file    Number of children: Not on file    Years of education: Not on file    Highest education level: Not on file   Occupational History    Not on file   Tobacco Use    Smoking status: Never    Smokeless tobacco: Never   Substance and Sexual Activity    Alcohol use: No    Drug use: No    Sexual activity: Yes     Partners: Male   Other Topics Concern     Service Not Asked    Blood Transfusions Not Asked    Caffeine Concern Yes     Comment: coffee-1 cup/day    Occupational Exposure Not Asked    Hobby Hazards Not Asked    Sleep Concern Not Asked    Stress Concern Not  Asked    Weight Concern Not Asked    Special Diet Not Asked    Back Care Not Asked    Exercise Not Asked    Bike Helmet Not Asked    Seat Belt Not Asked    Self-Exams Not Asked    Grew up on a farm Not Asked    History of tanning Not Asked    Outdoor occupation Not Asked    Pt has a pacemaker Not Asked    Pt has a defibrillator Not Asked    Breast feeding Not Asked    Reaction to local anesthetic No   Social History Narrative    Not on file     Social Drivers of Health     Food Insecurity: Not on file   Transportation Needs: Not on file   Stress: Not on file   Housing Stability: Not on file       Review of Systems:   Review of Systems   Constitutional:  Negative for chills.   HENT:  Positive for congestion, sinus pressure and sinus pain.    Gastrointestinal:  Negative for nausea and vomiting.   Genitourinary:  Positive for dysuria and frequency. Negative for flank pain and hematuria.        Vitals:    06/05/25 1104 06/05/25 1120   BP: 158/88 126/82   Pulse: 89    Weight: 162 lb (73.5 kg)      Body mass index is 27.81 kg/m².    Physical Exam:   Physical Exam  Vitals reviewed.   Constitutional:       General: She is not in acute distress.     Appearance: She is well-developed.   HENT:      Right Ear: Tympanic membrane, ear canal and external ear normal. There is no impacted cerumen.      Left Ear: Tympanic membrane, ear canal and external ear normal. There is no impacted cerumen.      Nose: Congestion present.      Right Sinus: Maxillary sinus tenderness present. No frontal sinus tenderness.      Left Sinus: Maxillary sinus tenderness present. No frontal sinus tenderness.      Mouth/Throat:      Mouth: Mucous membranes are moist.      Pharynx: Oropharynx is clear. No oropharyngeal exudate or posterior oropharyngeal erythema.   Eyes:      General:         Right eye: No discharge.         Left eye: No discharge.      Conjunctiva/sclera: Conjunctivae normal.   Cardiovascular:      Rate and Rhythm: Normal rate and regular  rhythm.      Heart sounds: Normal heart sounds, S1 normal and S2 normal. No murmur heard.  Pulmonary:      Effort: Pulmonary effort is normal.      Breath sounds: Normal breath sounds. No wheezing or rales.   Chest:      Chest wall: No tenderness.   Abdominal:      General: Abdomen is flat. Bowel sounds are normal. There is no distension.      Palpations: Abdomen is soft.      Tenderness: There is abdominal tenderness in the suprapubic area. There is no right CVA tenderness or left CVA tenderness.   Lymphadenopathy:      Cervical: No cervical adenopathy.   Skin:     Findings: No rash.   Neurological:      Mental Status: She is alert and oriented to person, place, and time.   Psychiatric:         Behavior: Behavior is cooperative.     There is a skin abrasion on the forehead.     Assessment and Plan::     Assessment & Plan  Painful urination    Orders:    POC Urinalysis, Automated Dip without microscopy ([facility] and [facility] ONLY) [14785]    Abnormal urinalysis    Orders:    Urine Culture, Routine; Future; Expected date: 06/05/2025    Urine Culture, Routine    Acute cystitis without hematuria    Orders:    Cephalexin; Take 1 capsule (500 mg total) by mouth 3 (three) times daily for 7 days.  Dispense: 21 capsule; Refill: 0    Acute non-recurrent maxillary sinusitis    Orders:    Cephalexin; Take 1 capsule (500 mg total) by mouth 3 (three) times daily for 7 days.  Dispense: 21 capsule; Refill: 0    Skin abrasion    Orders:    Mupirocin; Apply to affect lesions tid  Dispense: 30 g; Refill: 0       Discussed plan of care with pt and pt is in agreement.All questions answered. Pt to call with questions or concerns.         [1]   Current Outpatient Medications   Medication Sig Dispense Refill    mupirocin 2 % External Ointment Apply to affect lesions tid 30 g 0    cephALEXin 500 MG Oral Cap Take 1 capsule (500 mg total) by mouth 3 (three) times daily for 7 days. 21 capsule 0    benzonatate 200 MG Oral Cap Take 1 capsule (200 mg  total) by mouth 3 (three) times daily as needed for cough. 30 capsule 0   [2] No Known Allergies  [3] History reviewed. No pertinent past medical history.  [4] History reviewed. No pertinent surgical history.  [5]   Family History  Problem Relation Age of Onset    Heart Disease Father     Diabetes Neg         family h/o    Breast Cancer Neg     Ovarian Cancer Neg     Glaucoma Neg     Macular degeneration Neg

## 2025-06-11 ENCOUNTER — OFFICE VISIT (OUTPATIENT)
Dept: FAMILY MEDICINE CLINIC | Facility: CLINIC | Age: 50
End: 2025-06-11

## 2025-06-11 ENCOUNTER — LAB ENCOUNTER (OUTPATIENT)
Dept: LAB | Age: 50
End: 2025-06-11
Attending: PHYSICIAN ASSISTANT
Payer: COMMERCIAL

## 2025-06-11 VITALS
HEIGHT: 64 IN | WEIGHT: 163 LBS | DIASTOLIC BLOOD PRESSURE: 81 MMHG | HEART RATE: 89 BPM | SYSTOLIC BLOOD PRESSURE: 137 MMHG | BODY MASS INDEX: 27.83 KG/M2

## 2025-06-11 DIAGNOSIS — R73.03 PREDIABETES: ICD-10-CM

## 2025-06-11 DIAGNOSIS — Z00.00 WELLNESS EXAMINATION: ICD-10-CM

## 2025-06-11 DIAGNOSIS — Z12.31 ENCOUNTER FOR SCREENING MAMMOGRAM FOR BREAST CANCER: ICD-10-CM

## 2025-06-11 DIAGNOSIS — Z01.419 ENCOUNTER FOR ROUTINE GYNECOLOGICAL EXAMINATION WITH PAPANICOLAOU SMEAR OF CERVIX: ICD-10-CM

## 2025-06-11 DIAGNOSIS — E78.2 MIXED HYPERLIPIDEMIA: ICD-10-CM

## 2025-06-11 DIAGNOSIS — Z00.00 WELLNESS EXAMINATION: Primary | ICD-10-CM

## 2025-06-11 DIAGNOSIS — R05.1 ACUTE COUGH: ICD-10-CM

## 2025-06-11 DIAGNOSIS — Z12.11 SCREENING FOR MALIGNANT NEOPLASM OF COLON: ICD-10-CM

## 2025-06-11 LAB
ALBUMIN SERPL-MCNC: 4.7 G/DL (ref 3.2–4.8)
ALBUMIN/GLOB SERPL: 1.7 {RATIO} (ref 1–2)
ALP LIVER SERPL-CCNC: 101 U/L (ref 41–108)
ALT SERPL-CCNC: 25 U/L (ref 10–49)
ANION GAP SERPL CALC-SCNC: 9 MMOL/L (ref 0–18)
AST SERPL-CCNC: 21 U/L (ref ?–34)
BASOPHILS # BLD AUTO: 0.07 X10(3) UL (ref 0–0.2)
BASOPHILS NFR BLD AUTO: 1 %
BILIRUB SERPL-MCNC: 0.4 MG/DL (ref 0.3–1.2)
BILIRUB UR QL: NEGATIVE
BUN BLD-MCNC: 15 MG/DL (ref 9–23)
BUN/CREAT SERPL: 20 (ref 10–20)
CALCIUM BLD-MCNC: 9.5 MG/DL (ref 8.7–10.4)
CHLORIDE SERPL-SCNC: 105 MMOL/L (ref 98–112)
CHOLEST SERPL-MCNC: 242 MG/DL (ref ?–200)
CLARITY UR: CLEAR
CO2 SERPL-SCNC: 27 MMOL/L (ref 21–32)
CREAT BLD-MCNC: 0.75 MG/DL (ref 0.55–1.02)
DEPRECATED RDW RBC AUTO: 40.6 FL (ref 35.1–46.3)
EGFRCR SERPLBLD CKD-EPI 2021: 96 ML/MIN/1.73M2 (ref 60–?)
EOSINOPHIL # BLD AUTO: 0.54 X10(3) UL (ref 0–0.7)
EOSINOPHIL NFR BLD AUTO: 7.4 %
ERYTHROCYTE [DISTWIDTH] IN BLOOD BY AUTOMATED COUNT: 12.5 % (ref 11–15)
EST. AVERAGE GLUCOSE BLD GHB EST-MCNC: 117 MG/DL (ref 68–126)
FASTING PATIENT LIPID ANSWER: NO
FASTING STATUS PATIENT QL REPORTED: NO
GLOBULIN PLAS-MCNC: 2.7 G/DL (ref 2–3.5)
GLUCOSE BLD-MCNC: 97 MG/DL (ref 70–99)
GLUCOSE UR-MCNC: NORMAL MG/DL
HBA1C MFR BLD: 5.7 % (ref ?–5.7)
HCT VFR BLD AUTO: 43.4 % (ref 35–48)
HDLC SERPL-MCNC: 53 MG/DL (ref 40–59)
HGB BLD-MCNC: 14.2 G/DL (ref 12–16)
HGB UR QL STRIP.AUTO: NEGATIVE
IMM GRANULOCYTES # BLD AUTO: 0.03 X10(3) UL (ref 0–1)
IMM GRANULOCYTES NFR BLD: 0.4 %
KETONES UR-MCNC: NEGATIVE MG/DL
LDLC SERPL CALC-MCNC: 151 MG/DL (ref ?–100)
LEUKOCYTE ESTERASE UR QL STRIP.AUTO: 25
LYMPHOCYTES # BLD AUTO: 1.76 X10(3) UL (ref 1–4)
LYMPHOCYTES NFR BLD AUTO: 24.3 %
MCH RBC QN AUTO: 29 PG (ref 26–34)
MCHC RBC AUTO-ENTMCNC: 32.7 G/DL (ref 31–37)
MCV RBC AUTO: 88.6 FL (ref 80–100)
MONOCYTES # BLD AUTO: 0.71 X10(3) UL (ref 0.1–1)
MONOCYTES NFR BLD AUTO: 9.8 %
NEUTROPHILS # BLD AUTO: 4.14 X10 (3) UL (ref 1.5–7.7)
NEUTROPHILS # BLD AUTO: 4.14 X10(3) UL (ref 1.5–7.7)
NEUTROPHILS NFR BLD AUTO: 57.1 %
NITRITE UR QL STRIP.AUTO: NEGATIVE
NONHDLC SERPL-MCNC: 189 MG/DL (ref ?–130)
OSMOLALITY SERPL CALC.SUM OF ELEC: 293 MOSM/KG (ref 275–295)
PH UR: 5.5 [PH] (ref 5–8)
PLATELET # BLD AUTO: 288 10(3)UL (ref 150–450)
POTASSIUM SERPL-SCNC: 4.1 MMOL/L (ref 3.5–5.1)
PROT SERPL-MCNC: 7.4 G/DL (ref 5.7–8.2)
PROT UR-MCNC: NEGATIVE MG/DL
RBC # BLD AUTO: 4.9 X10(6)UL (ref 3.8–5.3)
SODIUM SERPL-SCNC: 141 MMOL/L (ref 136–145)
SP GR UR STRIP: 1.02 (ref 1–1.03)
TRIGL SERPL-MCNC: 210 MG/DL (ref 30–149)
TSI SER-ACNC: 1.08 UIU/ML (ref 0.55–4.78)
UROBILINOGEN UR STRIP-ACNC: NORMAL
VIT B12 SERPL-MCNC: 625 PG/ML (ref 211–911)
VIT D+METAB SERPL-MCNC: 28.4 NG/ML (ref 30–100)
VLDLC SERPL CALC-MCNC: 40 MG/DL (ref 0–30)
WBC # BLD AUTO: 7.3 X10(3) UL (ref 4–11)

## 2025-06-11 PROCEDURE — 84443 ASSAY THYROID STIM HORMONE: CPT

## 2025-06-11 PROCEDURE — 85025 COMPLETE CBC W/AUTO DIFF WBC: CPT

## 2025-06-11 PROCEDURE — 83036 HEMOGLOBIN GLYCOSYLATED A1C: CPT

## 2025-06-11 PROCEDURE — 36415 COLL VENOUS BLD VENIPUNCTURE: CPT

## 2025-06-11 PROCEDURE — 87624 HPV HI-RISK TYP POOLED RSLT: CPT | Performed by: PHYSICIAN ASSISTANT

## 2025-06-11 PROCEDURE — 81001 URINALYSIS AUTO W/SCOPE: CPT

## 2025-06-11 PROCEDURE — 87086 URINE CULTURE/COLONY COUNT: CPT

## 2025-06-11 PROCEDURE — 80061 LIPID PANEL: CPT

## 2025-06-11 PROCEDURE — 82607 VITAMIN B-12: CPT

## 2025-06-11 PROCEDURE — 80053 COMPREHEN METABOLIC PANEL: CPT

## 2025-06-11 PROCEDURE — 82306 VITAMIN D 25 HYDROXY: CPT

## 2025-06-11 RX ORDER — AZITHROMYCIN 250 MG/1
TABLET, FILM COATED ORAL
Qty: 6 TABLET | Refills: 0 | Status: SHIPPED | OUTPATIENT
Start: 2025-06-11 | End: 2025-06-16

## 2025-06-11 RX ORDER — CODEINE PHOSPHATE AND GUAIFENESIN 10; 100 MG/5ML; MG/5ML
5 SOLUTION ORAL EVERY 6 HOURS PRN
Qty: 118 ML | Refills: 0 | Status: SHIPPED | OUTPATIENT
Start: 2025-06-11

## 2025-06-11 NOTE — PROGRESS NOTES
HPI:   Cece Bronson is a 49 year old female who presents for an Annual Health Visit.   The patient complains of cough. Tessalon does not help.  The patient denies chest pain, SOB, N/V/C/D, fever, dizziness, syncope, and abdominal pain. There are no other concerns today.      Allergies:   Allergies[1]    CURRENT MEDICATIONS   Current Medications[2]   HISTORICAL INFORMATION   Past Medical History[3]   Past Surgical History[4]   Family History[5]   SOCIAL HISTORY   Short Social Hx on File[6]  Social History     Social History Narrative    Not on file        REVIEW OF SYSTEMS:     Review of Systems   Constitutional: Negative.    HENT: Negative.     Eyes: Negative.    Respiratory: Negative.     Cardiovascular: Negative.    Gastrointestinal: Negative.    Genitourinary: Negative.    Musculoskeletal: Negative.    Skin: Negative.    Neurological: Negative.    Psychiatric/Behavioral: Negative.           EXAM:   /81   Pulse 89   Ht 5' 4\" (1.626 m)   Wt 163 lb (73.9 kg)   BMI 27.98 kg/m²    Wt Readings from Last 6 Encounters:   06/11/25 163 lb (73.9 kg)   06/05/25 162 lb (73.5 kg)   06/02/25 163 lb (73.9 kg)   01/09/23 157 lb (71.2 kg)   07/11/22 153 lb (69.4 kg)   06/03/22 147 lb (66.7 kg)     Body mass index is 27.98 kg/m².    Physical Exam  Vitals reviewed.   Constitutional:       Appearance: She is well-developed.   HENT:      Head: Normocephalic and atraumatic.      Right Ear: Tympanic membrane, ear canal and external ear normal. There is no impacted cerumen.      Left Ear: Tympanic membrane, ear canal and external ear normal. There is no impacted cerumen.      Nose: Nose normal.      Mouth/Throat:      Mouth: Mucous membranes are moist.      Pharynx: Oropharynx is clear. No oropharyngeal exudate or posterior oropharyngeal erythema.   Eyes:      General:         Right eye: No discharge.         Left eye: No discharge.      Conjunctiva/sclera: Conjunctivae normal.   Cardiovascular:      Rate and Rhythm: Normal  rate and regular rhythm.      Heart sounds: Normal heart sounds.   Pulmonary:      Effort: Pulmonary effort is normal.      Breath sounds: Normal breath sounds.   Chest:      Chest wall: No mass, lacerations, deformity, swelling or tenderness.   Breasts:     Breasts are symmetrical.      Right: Normal. No inverted nipple, mass, nipple discharge, skin change or tenderness.      Left: Normal. No inverted nipple, mass, nipple discharge, skin change or tenderness.   Abdominal:      General: Abdomen is flat. Bowel sounds are normal. There is no distension.      Palpations: Abdomen is soft.      Tenderness: There is no abdominal tenderness. There is no right CVA tenderness or left CVA tenderness.   Genitourinary:     Labia:         Right: No rash, tenderness or lesion.         Left: No rash, tenderness or lesion.       Vagina: Normal.      Cervix: Normal.   Musculoskeletal:         General: Normal range of motion.      Cervical back: Normal range of motion and neck supple.   Lymphadenopathy:      Upper Body:      Right upper body: No supraclavicular or axillary adenopathy.      Left upper body: No supraclavicular or axillary adenopathy.   Skin:     Findings: No rash.   Neurological:      Mental Status: She is alert and oriented to person, place, and time.   Psychiatric:         Mood and Affect: Mood normal.         Behavior: Behavior normal.         Thought Content: Thought content normal.         Judgment: Judgment normal.          ASSESSMENT AND PLAN:   Cece was seen today for routine physical.    Diagnoses and all orders for this visit:    Wellness examination  -     Prevnar 20 (PCV20) [69530]  -     TETANUS, DIPHTHERIA TOXOIDS AND ACELLULAR PERTUSIS VACCINE (TDAP), >7 YEARS, IM USE  -     CBC With Differential With Platelet; Future  -     Comp Metabolic Panel (14); Future  -     Hemoglobin A1C; Future  -     Lipid Panel; Future  -     TSH W Reflex To Free T4; Future  -     Vitamin B12; Future  -     Vitamin D;  Future  -     Urinalysis with Culture Reflex; Future    Encounter for screening mammogram for breast cancer  -     [facility] SG 2D+3D SCREENING BILAT (CPT=[id number]); Future    Screening for malignant neoplasm of colon  -     Gastro GI Telephone Colon Screen; Future    Prediabetes  -     Hemoglobin A1C; Future    Mixed hyperlipidemia  -     CBC With Differential With Platelet; Future  -     Comp Metabolic Panel (14); Future  -     Hemoglobin A1C; Future  -     Lipid Panel; Future  -     TSH W Reflex To Free T4; Future  -     CT CALCIUM SCORING; Future    Acute cough  -     azithromycin 250 MG Oral Tab; Take 2 tablets (500 mg total) by mouth daily for 1 day, THEN 1 tablet (250 mg total) daily for 4 days.  -     guaiFENesin-codeine 100-10 MG/5ML Oral Solution; Take 5 mL by mouth every 6 (six) hours as needed.    Encounter for routine gynecological examination with Papanicolaou smear of cervix  -     ThinPrep PAP with HPV Reflex Request B; Future      Overall health discussed, exercise/activity appropriate for age and health status, heathy diety, preventive care, and upcoming screening discussed. Routine labs ordered.    There are no Patient Instructions on file for this visit.    The patient indicates understanding of these issues and agrees to the plan.    Problem List:  Problem List[7]    Efra Chadwick PA-C  6/11/2025  9:49 AM               [1] No Known Allergies  [2]   Current Outpatient Medications   Medication Sig Dispense Refill    azithromycin 250 MG Oral Tab Take 2 tablets (500 mg total) by mouth daily for 1 day, THEN 1 tablet (250 mg total) daily for 4 days. 6 tablet 0    guaiFENesin-codeine 100-10 MG/5ML Oral Solution Take 5 mL by mouth every 6 (six) hours as needed. 118 mL 0    mupirocin 2 % External Ointment Apply to affect lesions tid 30 g 0    benzonatate 200 MG Oral Cap Take 1 capsule (200 mg total) by mouth 3 (three) times daily as needed for cough. 30 capsule 0   [3] History reviewed. No pertinent  past medical history.  [4] History reviewed. No pertinent surgical history.  [5]   Family History  Problem Relation Age of Onset    Heart Disease Father     Diabetes Neg         family h/o    Breast Cancer Neg     Ovarian Cancer Neg     Glaucoma Neg     Macular degeneration Neg    [6]   Social History  Socioeconomic History    Marital status:    Tobacco Use    Smoking status: Never    Smokeless tobacco: Never   Substance and Sexual Activity    Alcohol use: No    Drug use: No    Sexual activity: Yes     Partners: Male   Other Topics Concern    Caffeine Concern Yes     Comment: coffee-1 cup/day    Reaction to local anesthetic No   [7]   Patient Active Problem List  Diagnosis    Overweight (BMI 25.0-29.9)    Irregular menses    Anovulatory (dysfunctional uterine) bleeding    Inversion of left nipple    Hypercholesteremia    Marital problems    Subconjunctival hemorrhage of right eye

## 2025-06-16 LAB — HPV E6+E7 MRNA CVX QL NAA+PROBE: NEGATIVE

## 2025-06-18 ENCOUNTER — TELEPHONE (OUTPATIENT)
Dept: FAMILY MEDICINE CLINIC | Facility: CLINIC | Age: 50
End: 2025-06-18

## 2025-06-18 NOTE — TELEPHONE ENCOUNTER
----- Message From: Efra Chadwick PA-C Sent: 6/17/2025 10:41 PM CDT---    Pap and HPV are negative. Repeat PAP in 5 years.

## (undated) DIAGNOSIS — B35.1 ONYCHOMYCOSIS: Primary | ICD-10-CM

## (undated) NOTE — LETTER
6/5/2025          To Whom It May Concern:    Cece Bronson is currently under my medical care and may not return to work at this time.    She may return to work on  6/6/2025.  Activity is restricted as follows: none.    If you require additional information please contact our office.        Sincerely,    Efra Chadwick PA-C

## (undated) NOTE — Clinical Note
Patient enrolled in COVID+ monitoring program.  Unable to reach patient now, will try again later today and then again tomorrow. Will schedule virtual visit for either Friday or Monday.

## (undated) NOTE — LETTER
12/16/2020              Pedro Linear        [address] [address]         Dear Dominguez Bains records indicate that the tests ordered for you by Sara Ramirez. MD Jamar  have not been done.   If you have, in fact, already completed the tests or

## (undated) NOTE — LETTER
July 12, 2018     [address]      Dear Adrienne Feliz:    Below are the results from your recent visit:    Resulted Orders   US PELVIS ([address]) (SEI=[id number])    Narrative    PROCEDURE: US PELVIS

## (undated) NOTE — LETTER
5/1/2018              Lashae Lira        [address]         Dear Karuna Heads,    It was a pleasure to see you. Your PAP test was normal.  There is no need for further testing at this time.   I look forward to seeing you at you

## (undated) NOTE — LETTER
April 30, 2018     Cyntha Epley  [facility]      Dear Luna Dumont:    Below are the results from your recent visit:    Resulted Orders   HPV HIGH RISK , THIN PREP COLLECTION   Result Value Ref Range    HPV High Risk Negative Raymond Andersen

## (undated) NOTE — LETTER
7/26/2018              Lashae Lira        [address] [facility]         Dear Karuna Jaquez,      Your lab test for h.pylori bacteria was normal, meaning it was successfully treated and you no longer have the bacteria.  If you have any othe

## (undated) NOTE — LETTER
9/1/2020          To Whom It May Concern:    Jad Odom is currently under my medical care and may return to work on 9/2/2020. Activity is restricted as follows: none. If you require additional information please contact our office. Sincerely,      Aura Zhang MD          Document generated by:   Vi Campos

## (undated) NOTE — LETTER
7/11/2022          To Whom It May Concern:    Florencia Oppenheim is currently under my medical care and may not return to work at this time. Please excuse Janice Michaud for 1 days. She may return to work on 07/12/2022. Activity is restricted as follows: no prolonged standing OR WALKING. If you require additional information please contact our office. Sincerely,      Yumiko Alvarez. José Schulte DO          Document generated by:  Yumiko Alvarez.  DO Elvia

## (undated) NOTE — Clinical Note
2nd day attempting to reach patient, unsuccessful. Unable to leave message, line keeps ringing. Will call pt tomorrow.

## (undated) NOTE — LETTER
6/23/2025              Cece Bronson        [address] [facility] [address]         Dear Cece,      It was a pleasure to see you at our [facility], [address], [place] office.  Your papsmear and HPV tests were normal.  There is no need for further testing at this time.  I look forward to seeing you at your next scheduled appointment.              Yours Truly,    Efra Chadwick PA-C  [address] [place] [address]  Ph: [phone]  Fax: [phone]